# Patient Record
Sex: FEMALE | Race: AMERICAN INDIAN OR ALASKA NATIVE | Employment: OTHER | URBAN - METROPOLITAN AREA
[De-identification: names, ages, dates, MRNs, and addresses within clinical notes are randomized per-mention and may not be internally consistent; named-entity substitution may affect disease eponyms.]

---

## 2018-12-03 ENCOUNTER — OFFICE VISIT (OUTPATIENT)
Dept: INTERNAL MEDICINE CLINIC | Facility: CLINIC | Age: 63
End: 2018-12-03
Payer: MEDICAID

## 2018-12-03 ENCOUNTER — LAB ENCOUNTER (OUTPATIENT)
Dept: LAB | Age: 63
End: 2018-12-03
Attending: INTERNAL MEDICINE
Payer: MEDICAID

## 2018-12-03 VITALS
RESPIRATION RATE: 18 BRPM | HEART RATE: 81 BPM | WEIGHT: 161 LBS | SYSTOLIC BLOOD PRESSURE: 120 MMHG | OXYGEN SATURATION: 98 % | HEIGHT: 61 IN | DIASTOLIC BLOOD PRESSURE: 80 MMHG | TEMPERATURE: 98 F | BODY MASS INDEX: 30.4 KG/M2

## 2018-12-03 DIAGNOSIS — H35.30 MACULAR DEGENERATION OF BOTH EYES, UNSPECIFIED TYPE: ICD-10-CM

## 2018-12-03 DIAGNOSIS — G89.29 CHRONIC PAIN OF BOTH KNEES: ICD-10-CM

## 2018-12-03 DIAGNOSIS — K52.9 COLITIS: Primary | ICD-10-CM

## 2018-12-03 DIAGNOSIS — R73.03 PRE-DIABETES: ICD-10-CM

## 2018-12-03 DIAGNOSIS — K52.9 COLITIS: ICD-10-CM

## 2018-12-03 DIAGNOSIS — M54.50 CHRONIC BILATERAL LOW BACK PAIN WITHOUT SCIATICA: ICD-10-CM

## 2018-12-03 DIAGNOSIS — Z12.39 SCREENING FOR BREAST CANCER: ICD-10-CM

## 2018-12-03 DIAGNOSIS — M25.50 ARTHRALGIA, UNSPECIFIED JOINT: ICD-10-CM

## 2018-12-03 DIAGNOSIS — E78.2 MIXED HYPERLIPIDEMIA: ICD-10-CM

## 2018-12-03 DIAGNOSIS — M54.2 NECK PAIN: ICD-10-CM

## 2018-12-03 DIAGNOSIS — G89.29 CHRONIC BILATERAL LOW BACK PAIN WITHOUT SCIATICA: ICD-10-CM

## 2018-12-03 DIAGNOSIS — M25.561 CHRONIC PAIN OF BOTH KNEES: ICD-10-CM

## 2018-12-03 DIAGNOSIS — M25.562 CHRONIC PAIN OF BOTH KNEES: ICD-10-CM

## 2018-12-03 PROCEDURE — 84439 ASSAY OF FREE THYROXINE: CPT

## 2018-12-03 PROCEDURE — 85025 COMPLETE CBC W/AUTO DIFF WBC: CPT

## 2018-12-03 PROCEDURE — 36415 COLL VENOUS BLD VENIPUNCTURE: CPT

## 2018-12-03 PROCEDURE — 86200 CCP ANTIBODY: CPT

## 2018-12-03 PROCEDURE — 83036 HEMOGLOBIN GLYCOSYLATED A1C: CPT

## 2018-12-03 PROCEDURE — 80061 LIPID PANEL: CPT

## 2018-12-03 PROCEDURE — 84460 ALANINE AMINO (ALT) (SGPT): CPT

## 2018-12-03 PROCEDURE — 84443 ASSAY THYROID STIM HORMONE: CPT

## 2018-12-03 PROCEDURE — 90471 IMMUNIZATION ADMIN: CPT | Performed by: INTERNAL MEDICINE

## 2018-12-03 PROCEDURE — 86803 HEPATITIS C AB TEST: CPT

## 2018-12-03 PROCEDURE — 84450 TRANSFERASE (AST) (SGOT): CPT

## 2018-12-03 PROCEDURE — 86038 ANTINUCLEAR ANTIBODIES: CPT

## 2018-12-03 PROCEDURE — 90686 IIV4 VACC NO PRSV 0.5 ML IM: CPT | Performed by: INTERNAL MEDICINE

## 2018-12-03 PROCEDURE — 86431 RHEUMATOID FACTOR QUANT: CPT

## 2018-12-03 PROCEDURE — 99204 OFFICE O/P NEW MOD 45 MIN: CPT | Performed by: INTERNAL MEDICINE

## 2018-12-03 PROCEDURE — 80048 BASIC METABOLIC PNL TOTAL CA: CPT

## 2018-12-03 RX ORDER — RIBOFLAVIN (VITAMIN B2) 100 MG
100 TABLET ORAL DAILY
COMMUNITY
End: 2021-04-21

## 2018-12-03 RX ORDER — MULTIVIT-MINS NO.63/IRON/FOLIC 27 MG-1 MG
1 TABLET ORAL DAILY
COMMUNITY
End: 2019-02-13

## 2018-12-03 RX ORDER — ACETAMINOPHEN 160 MG
2000 TABLET,DISINTEGRATING ORAL DAILY
COMMUNITY
End: 2019-02-13

## 2018-12-03 RX ORDER — OMEGA-3-ACID ETHYL ESTERS 1 G/1
1 CAPSULE, LIQUID FILLED ORAL DAILY
COMMUNITY
End: 2019-02-13

## 2018-12-03 RX ORDER — ACETAMINOPHEN 500 MG
1000 TABLET ORAL
COMMUNITY
End: 2019-02-13

## 2018-12-03 NOTE — PROGRESS NOTES
Dori Love is a 58year old female. HPI:   Patient presents for the following issues. We will re-schedule CPX. She moved here in Sept, 2018 from [place], [place] so she is establishing care. She moved here to be closer to her daughter. She is a . f/c  NEURO: denies any  LH, dizzyness, LOC, falls. Right sided headaches - more often in the colder weather. Usually has a headache 2-3 times per week. They last for 5-10 minutes. Not sure what improves it. She tries lying down and resting.  Tylenol helps s b/l  She has pain over 16/18 tender points    ASSESSMENT AND PLAN:   # Macular Degeneration in both eyes: no vision out of right eye. Needs referral to ophtho.    # Arthralgias of both hands, knees, back, and lower legs: DDX includes inflammatory arthritis

## 2018-12-04 ENCOUNTER — TELEPHONE (OUTPATIENT)
Dept: INTERNAL MEDICINE CLINIC | Facility: CLINIC | Age: 63
End: 2018-12-04

## 2018-12-04 NOTE — TELEPHONE ENCOUNTER
Records Requested from:    [facility] - previous pcp    Phone: [phone]  Fax: [phone]      Confirmation was received. Copy of form made and placed in teal accordion file. Original form sent to scanning.

## 2018-12-12 ENCOUNTER — TELEPHONE (OUTPATIENT)
Dept: INTERNAL MEDICINE CLINIC | Facility: CLINIC | Age: 63
End: 2018-12-12

## 2019-01-07 ENCOUNTER — HOSPITAL ENCOUNTER (OUTPATIENT)
Dept: PHYSICAL THERAPY | Facility: HOSPITAL | Age: 64
Setting detail: THERAPIES SERIES
Discharge: HOME | End: 2019-01-07
Attending: INTERNAL MEDICINE
Payer: MEDICAID

## 2019-01-07 DIAGNOSIS — M54.2 NECK PAIN: ICD-10-CM

## 2019-01-07 DIAGNOSIS — M54.50 CHRONIC BILATERAL LOW BACK PAIN WITHOUT SCIATICA: ICD-10-CM

## 2019-01-07 DIAGNOSIS — G89.29 CHRONIC BILATERAL LOW BACK PAIN WITHOUT SCIATICA: ICD-10-CM

## 2019-01-07 DIAGNOSIS — M25.561 CHRONIC PAIN OF BOTH KNEES: ICD-10-CM

## 2019-01-07 DIAGNOSIS — G89.29 CHRONIC PAIN OF BOTH KNEES: ICD-10-CM

## 2019-01-07 DIAGNOSIS — M25.562 CHRONIC PAIN OF BOTH KNEES: ICD-10-CM

## 2019-01-07 PROCEDURE — 97162 PT EVAL MOD COMPLEX 30 MIN: CPT

## 2019-01-07 PROCEDURE — 97110 THERAPEUTIC EXERCISES: CPT

## 2019-01-07 NOTE — PROGRESS NOTES
SPINE EVALUATION:   Referring Physician: Dr. Connie Evans  Diagnosis: chronic bilateral LBP w/o sciatica, chronic pain of both knees, neck pain     Date of Service: 1/7/2019     PATIENT SUMMARY   Leah Mistry is a 58year old female who presents to therapy tod above impairments to decrease pain and return to prior level of function.     Precautions:  None  OBJECTIVE:   Observation/Posture: increased thoracic kyphosis, FHP, rounded shoulder, increased lumbar lordosis  Gait: slow gait speed, antalgic gait  Neuro Sc Goals: 1. Patient will demonstrate lumbar flexion within available range with no pain within 6 weeks in order to pick objects from the floor.   2. Patient will tolerate ambulation for 30 min with no increase in pain within 6 weeks to improve community

## 2019-01-09 ENCOUNTER — HOSPITAL ENCOUNTER (OUTPATIENT)
Dept: PHYSICAL THERAPY | Facility: HOSPITAL | Age: 64
Setting detail: THERAPIES SERIES
Discharge: HOME | End: 2019-01-09
Attending: INTERNAL MEDICINE
Payer: MEDICAID

## 2019-01-09 PROCEDURE — 97110 THERAPEUTIC EXERCISES: CPT

## 2019-01-09 NOTE — PROGRESS NOTES
Dx: chronic bilateral LBP w/o sciatica, chronic pain of both knees, neck pain             Authorized # of Visits:  8         Next MD visit: none scheduled  Fall Risk: standard         Precautions: n/a             Subjective: Patient reports that the exerci

## 2019-01-14 ENCOUNTER — APPOINTMENT (OUTPATIENT)
Dept: PHYSICAL THERAPY | Facility: HOSPITAL | Age: 64
End: 2019-01-14
Payer: MEDICAID

## 2019-01-15 ENCOUNTER — HOSPITAL ENCOUNTER (OUTPATIENT)
Dept: PHYSICAL THERAPY | Facility: HOSPITAL | Age: 64
Setting detail: THERAPIES SERIES
Discharge: HOME | End: 2019-01-15
Attending: INTERNAL MEDICINE
Payer: MEDICAID

## 2019-01-15 PROCEDURE — 97110 THERAPEUTIC EXERCISES: CPT

## 2019-01-15 NOTE — PROGRESS NOTES
Dx: chronic bilateral LBP w/o sciatica, chronic pain of both knees, neck pain             Authorized # of Visits:  8         Next MD visit: none scheduled  Fall Risk: standard         Precautions: n/a             Subjective: Patient reports that standing l lumbar rotation mobilzation           Charges: Betty 3( 40 min) manual x 0 ( 5 min)   Total Timed Treatment: 45 min     Total Treatment Time: 45 min

## 2019-01-16 ENCOUNTER — APPOINTMENT (OUTPATIENT)
Dept: PHYSICAL THERAPY | Facility: HOSPITAL | Age: 64
End: 2019-01-16
Payer: MEDICAID

## 2019-01-18 ENCOUNTER — APPOINTMENT (OUTPATIENT)
Dept: PHYSICAL THERAPY | Facility: HOSPITAL | Age: 64
End: 2019-01-18
Payer: MEDICAID

## 2019-01-21 ENCOUNTER — APPOINTMENT (OUTPATIENT)
Dept: PHYSICAL THERAPY | Facility: HOSPITAL | Age: 64
End: 2019-01-21
Payer: MEDICAID

## 2019-01-22 ENCOUNTER — HOSPITAL ENCOUNTER (OUTPATIENT)
Dept: PHYSICAL THERAPY | Facility: HOSPITAL | Age: 64
Setting detail: THERAPIES SERIES
Discharge: HOME | End: 2019-01-22
Attending: INTERNAL MEDICINE
Payer: MEDICAID

## 2019-01-22 PROCEDURE — 97110 THERAPEUTIC EXERCISES: CPT

## 2019-01-22 NOTE — PROGRESS NOTES
Dx: chronic bilateral LBP w/o sciatica, chronic pain of both knees, neck pain             Authorized # of Visits:  8         Next MD visit: none scheduled  Fall Risk: standard         Precautions: n/a             Subjective: Patient reports that she has be min       Heel raises, 2x10 Heel raises, 2x10 Heel raises, 2x15       Clam, 2x15 Bridge, 2x10 Posterior and medial knee STM, 2 min       HS stretch HS stretch Knee flexion w/ fulcrum distraction       Calf stretch Calf stretch S/l T8-11 CPA       S/l lumba

## 2019-01-23 ENCOUNTER — APPOINTMENT (OUTPATIENT)
Dept: PHYSICAL THERAPY | Facility: HOSPITAL | Age: 64
End: 2019-01-23
Payer: MEDICAID

## 2019-01-25 ENCOUNTER — APPOINTMENT (OUTPATIENT)
Dept: PHYSICAL THERAPY | Facility: HOSPITAL | Age: 64
End: 2019-01-25
Payer: MEDICAID

## 2019-01-28 ENCOUNTER — APPOINTMENT (OUTPATIENT)
Dept: PHYSICAL THERAPY | Facility: HOSPITAL | Age: 64
End: 2019-01-28
Payer: MEDICAID

## 2019-01-29 ENCOUNTER — APPOINTMENT (OUTPATIENT)
Dept: PHYSICAL THERAPY | Facility: HOSPITAL | Age: 64
End: 2019-01-29
Payer: MEDICAID

## 2019-02-01 ENCOUNTER — APPOINTMENT (OUTPATIENT)
Dept: PHYSICAL THERAPY | Facility: HOSPITAL | Age: 64
End: 2019-02-01
Attending: INTERNAL MEDICINE
Payer: MEDICAID

## 2019-02-05 ENCOUNTER — HOSPITAL ENCOUNTER (OUTPATIENT)
Dept: PHYSICAL THERAPY | Facility: HOSPITAL | Age: 64
Setting detail: THERAPIES SERIES
Discharge: HOME | End: 2019-02-05
Attending: INTERNAL MEDICINE
Payer: MEDICAID

## 2019-02-05 PROCEDURE — 97110 THERAPEUTIC EXERCISES: CPT

## 2019-02-05 NOTE — PROGRESS NOTES
Dx: chronic bilateral LBP w/o sciatica, chronic pain of both knees, neck pain             Authorized # of Visits:  8         Next MD visit: none scheduled  Fall Risk: standard         Precautions: n/a             Subjective: Patient reports that her back i order to pick objects from the floor. (progressing)  2. Patient will tolerate ambulation for 30 min with no increase in pain within 6 weeks to improve community involvement. (progressing)  3.  Patient will demonstrate 4/5 hip strength in all directions in o

## 2019-02-12 ENCOUNTER — HOSPITAL ENCOUNTER (OUTPATIENT)
Dept: PHYSICAL THERAPY | Facility: HOSPITAL | Age: 64
Setting detail: THERAPIES SERIES
Discharge: HOME | End: 2019-02-12
Attending: INTERNAL MEDICINE
Payer: MEDICAID

## 2019-02-12 PROCEDURE — 97110 THERAPEUTIC EXERCISES: CPT

## 2019-02-12 PROCEDURE — 97140 MANUAL THERAPY 1/> REGIONS: CPT

## 2019-02-12 NOTE — PROGRESS NOTES
Dx: chronic bilateral LBP w/o sciatica, chronic pain of both knees, neck pain             Authorized # of Visits:  8         Next MD visit: none scheduled  Fall Risk: standard         Precautions: n/a             Subjective: Patient complains of continued lv 5, 5 min Nustep, lv 5, 5 min Nustep, lv 6, 5 min Nustep, lv 6, 5 min UBE, lv5, 5 min     Supine trunk rotation Supine trunk rotation Dual cable row, 10#, 2x10  Supine<>sit trainng     Supine marching, 2x10 Tib/fem AP, 5 min Yellow TB horiz abd, 2x10 mechelle

## 2019-02-13 ENCOUNTER — HOSPITAL ENCOUNTER (OUTPATIENT)
Dept: MAMMOGRAPHY | Age: 64
Discharge: HOME | End: 2019-02-13
Attending: INTERNAL MEDICINE
Payer: MEDICAID

## 2019-02-13 ENCOUNTER — OFFICE VISIT (OUTPATIENT)
Dept: INTERNAL MEDICINE CLINIC | Facility: CLINIC | Age: 64
End: 2019-02-13
Payer: MEDICAID

## 2019-02-13 VITALS
WEIGHT: 164.5 LBS | SYSTOLIC BLOOD PRESSURE: 128 MMHG | HEIGHT: 61 IN | DIASTOLIC BLOOD PRESSURE: 66 MMHG | OXYGEN SATURATION: 98 % | HEART RATE: 89 BPM | TEMPERATURE: 98 F | RESPIRATION RATE: 16 BRPM | BODY MASS INDEX: 31.06 KG/M2

## 2019-02-13 DIAGNOSIS — Z00.00 ROUTINE GENERAL MEDICAL EXAMINATION AT A HEALTH CARE FACILITY: Primary | ICD-10-CM

## 2019-02-13 DIAGNOSIS — E11.9 TYPE 2 DIABETES MELLITUS WITHOUT COMPLICATION, WITHOUT LONG-TERM CURRENT USE OF INSULIN (HCC): ICD-10-CM

## 2019-02-13 DIAGNOSIS — K52.9 COLITIS: ICD-10-CM

## 2019-02-13 DIAGNOSIS — Z12.4 SCREENING FOR CERVICAL CANCER: ICD-10-CM

## 2019-02-13 DIAGNOSIS — Z12.39 SCREENING FOR BREAST CANCER: ICD-10-CM

## 2019-02-13 DIAGNOSIS — M89.9 DISORDER OF BONE AND CARTILAGE: ICD-10-CM

## 2019-02-13 DIAGNOSIS — M94.9 DISORDER OF BONE AND CARTILAGE: ICD-10-CM

## 2019-02-13 DIAGNOSIS — E78.2 MIXED HYPERLIPIDEMIA: ICD-10-CM

## 2019-02-13 PROCEDURE — 88175 CYTOPATH C/V AUTO FLUID REDO: CPT | Performed by: INTERNAL MEDICINE

## 2019-02-13 PROCEDURE — 99396 PREV VISIT EST AGE 40-64: CPT | Performed by: INTERNAL MEDICINE

## 2019-02-13 PROCEDURE — 77063 BREAST TOMOSYNTHESIS BI: CPT | Performed by: INTERNAL MEDICINE

## 2019-02-13 PROCEDURE — 77067 SCR MAMMO BI INCL CAD: CPT | Performed by: INTERNAL MEDICINE

## 2019-02-13 RX ORDER — ACETAMINOPHEN 160 MG
2000 TABLET,DISINTEGRATING ORAL DAILY
Qty: 90 CAPSULE | Refills: 3 | Status: SHIPPED | OUTPATIENT
Start: 2019-02-13 | End: 2020-10-22 | Stop reason: ALTCHOICE

## 2019-02-13 RX ORDER — ACETAMINOPHEN 500 MG
1000 TABLET ORAL EVERY 8 HOURS PRN
Qty: 90 TABLET | Refills: 0 | Status: SHIPPED | OUTPATIENT
Start: 2019-02-13 | End: 2019-03-18

## 2019-02-13 RX ORDER — MULTIVIT-MINS NO.63/IRON/FOLIC 27 MG-1 MG
1 TABLET ORAL DAILY
Qty: 90 TABLET | Refills: 3 | Status: SHIPPED | OUTPATIENT
Start: 2019-02-13 | End: 2019-06-19

## 2019-02-13 RX ORDER — OMEGA-3-ACID ETHYL ESTERS 1 G/1
1 CAPSULE, LIQUID FILLED ORAL DAILY
Qty: 90 CAPSULE | Refills: 3 | Status: SHIPPED | OUTPATIENT
Start: 2019-02-13

## 2019-02-13 NOTE — PROGRESS NOTES
Dilip Arreola is a 58year old female. HPI:   Patient presents for a new diagnosis. 1. Arthralgias: attending PT but that has not helped. Inflammatory markers were not revealing for inflammaiton. Worst pain is in both 1st MCP joints at base of thumb. MI   • Diabetes Father       No past medical history on file.    Past Surgical History:   Procedure Laterality Date   • COLONOSCOPY  07/17/2018   • OOPHORECTOMY Right 1994    cyst   • OTHER SURGICAL HISTORY  1998    right ovarian removal for cyst      Soc based nutrition, regular exercise, and practicing mindfulness. We outlined small, achievable goals.    - DM eye exam on 12/12/18 by Augusto Ramos negative for diabetic retinopathy b/l.   - Foot Exam: 2/2019, educated on nightly foot exams  - LDL: we will start

## 2019-02-13 NOTE — PATIENT INSTRUCTIONS
Please do not exceed 3000 mg (3 grams) of tylenol in 24 hours. It is very important you look at the amount of tylenol (acetaminophen) in any of your over the counter medications to ensure you do not exceed a safe amount of tylenol per day.     You need 3

## 2019-02-19 ENCOUNTER — HOSPITAL ENCOUNTER (OUTPATIENT)
Dept: PHYSICAL THERAPY | Facility: HOSPITAL | Age: 64
Setting detail: THERAPIES SERIES
Discharge: HOME | End: 2019-02-19
Attending: INTERNAL MEDICINE
Payer: MEDICAID

## 2019-02-19 PROCEDURE — 97140 MANUAL THERAPY 1/> REGIONS: CPT

## 2019-02-19 PROCEDURE — 97110 THERAPEUTIC EXERCISES: CPT

## 2019-02-19 NOTE — PROGRESS NOTES
Dx: chronic bilateral LBP w/o sciatica, chronic pain of both knees, neck pain             Authorized # of Visits:  8         Next MD visit: none scheduled  Fall Risk: standard         Precautions: n/a             Subjective: Patient reports continued back trainng S/l C7 CPA, gr III    Supine marching, 2x10 Tib/fem AP, 5 min Yellow TB horiz abd, 2x10 reassessment, 10 min Seated thoracic roatation, 20 Supine cervical manual traction    Bridge, 2x10 Passive knee flexion w/ strap, [id number] Bar press to chin tuck

## 2019-02-20 ENCOUNTER — HOSPITAL ENCOUNTER (OUTPATIENT)
Dept: PHYSICAL THERAPY | Facility: HOSPITAL | Age: 64
Setting detail: THERAPIES SERIES
Discharge: HOME | End: 2019-02-20
Attending: INTERNAL MEDICINE
Payer: MEDICAID

## 2019-02-20 PROCEDURE — 97140 MANUAL THERAPY 1/> REGIONS: CPT

## 2019-02-20 PROCEDURE — 97110 THERAPEUTIC EXERCISES: CPT

## 2019-02-20 NOTE — PROGRESS NOTES
SPINE PROGRESS:   Referring Physician: Dr. Roseann Hernandez  Diagnosis: chronic bilateral LBP w/o sciatica, chronic pain of both knees, neck pain     Date of Service: 2/20/2019     PATIENT SUMMARY   Federico Clinton is a 58year old female who has attended 8 sessions ROM:  Flexion: R WNL; L 110 deg  Extension: R WNL; L WNL  ER: R WNL; L WNL  IR: R WNL; L WNL    Accessory motion: N/T  Palpation: TTP bilateral lumbar paraspinals    Strength:   UE/Scapular LE     Mid trap: R 4-/5; L 4-/5  Low trap: R 4-/5; L 4-/5   St instruction    Education or treatment limitation: None  Rehab Potential:good    FOTO: 40/100    Patient/Family/Caregiver was advised of these findings, precautions, and treatment options and has agreed to actively participate in planning and for this cours

## 2019-02-27 ENCOUNTER — HOSPITAL ENCOUNTER (OUTPATIENT)
Dept: PHYSICAL THERAPY | Facility: HOSPITAL | Age: 64
Setting detail: THERAPIES SERIES
Discharge: HOME | End: 2019-02-27
Attending: INTERNAL MEDICINE
Payer: MEDICAID

## 2019-02-27 PROCEDURE — 97110 THERAPEUTIC EXERCISES: CPT

## 2019-02-27 PROCEDURE — 97140 MANUAL THERAPY 1/> REGIONS: CPT

## 2019-02-27 NOTE — PROGRESS NOTES
Dx: chronic bilateral LBP w/o sciatica, chronic pain of both knees, neck pain             Authorized # of Visits:  15         Next MD visit: none scheduled  Fall Risk: standard         Precautions: n/a             Subjective: Patient reports that her agusto Supine trunk rotation Dual cable row, 10#, 2x10  Supine<>sit trainng S/l C7 CPA, gr III Table pushup, 2x10   Supine marching, 2x10 Tib/fem AP, 5 min Yellow TB horiz abd, 2x10 reassessment, 10 min Seated thoracic roatation, 20 Supine cervical manual tractio

## 2019-03-06 ENCOUNTER — HOSPITAL ENCOUNTER (OUTPATIENT)
Dept: PHYSICAL THERAPY | Facility: HOSPITAL | Age: 64
Setting detail: THERAPIES SERIES
Discharge: HOME | End: 2019-03-06
Attending: INTERNAL MEDICINE
Payer: MEDICAID

## 2019-03-06 PROCEDURE — 97140 MANUAL THERAPY 1/> REGIONS: CPT

## 2019-03-06 PROCEDURE — 97110 THERAPEUTIC EXERCISES: CPT

## 2019-03-06 NOTE — PROGRESS NOTES
Dx: chronic bilateral LBP w/o sciatica, chronic pain of both knees, neck pain             Authorized # of Visits:  15         Next MD visit: none scheduled  Fall Risk: standard         Precautions: n/a             Subjective: Patient reports that her L a 6/8 Date: 2/19/19  Tx#: 7/8 Date: 2/17/19  Tx#: 9/14 Date: 3/6/19  Tx#: 10/14       Nustep, lv 5, 5 min Nustep, lv 5, 5 min Nustep, lv 6, 5 min Nustep, lv 6, 5 min UBE, lv5, 5 min  UBE, lv5, 5 min scapular retraction w/ ER, yellow, 2x15       Supine trunk Calf stretch Calf stretch S/l T8-11 CPA            S/l lumbar rotation mobilzation  S/l trunk rotation, 10 each              Charges: Betty 2( 30 min) manual x 1 ( 15 min)   Total Timed Treatment: 45 min     Total Treatment Time: 45 min

## 2019-03-12 ENCOUNTER — HOSPITAL ENCOUNTER (OUTPATIENT)
Dept: PHYSICAL THERAPY | Facility: HOSPITAL | Age: 64
Setting detail: THERAPIES SERIES
Discharge: HOME | End: 2019-03-12
Attending: INTERNAL MEDICINE
Payer: MEDICAID

## 2019-03-12 PROCEDURE — 97110 THERAPEUTIC EXERCISES: CPT

## 2019-03-12 PROCEDURE — 97140 MANUAL THERAPY 1/> REGIONS: CPT

## 2019-03-12 NOTE — PROGRESS NOTES
Dx: chronic bilateral LBP w/o sciatica, chronic pain of both knees, neck pain             Authorized # of Visits:  15         Next MD visit: none scheduled  Fall Risk: standard         Precautions: n/a             Subjective: Patient reports that she had Date: 1/22/19  Tx#: 4/8 Date: 2/5/19  Tx#: 5/8 Date: 2/12/19  Tx#: 6/8 Date: 2/19/19  Tx#: 7/8 Date: 2/17/19  Tx#: 9/14 Date: 3/6/19  Tx#: 10/14 Date: 3/12/19  Tx#: 11/14      Nustep, lv 5, 5 min Nustep, lv 5, 5 min Nustep, lv 6, 5 min Nustep, lv 6, 5 min w/ hammer, 20 Elbow STM, 5 min Active thumb adduction, extension, 20 each      HS stretch HS stretch Knee flexion w/ fulcrum distraction Seated chin tuck w/ cuing for thoracic extension, 15x5 sec Seated chin tuck w/ cuing for thoracic extension, 15x5 sec

## 2019-03-13 ENCOUNTER — HOSPITAL ENCOUNTER (OUTPATIENT)
Dept: PHYSICAL THERAPY | Facility: HOSPITAL | Age: 64
Setting detail: THERAPIES SERIES
Discharge: HOME | End: 2019-03-13
Attending: INTERNAL MEDICINE
Payer: MEDICAID

## 2019-03-13 PROCEDURE — 97140 MANUAL THERAPY 1/> REGIONS: CPT

## 2019-03-13 PROCEDURE — 97110 THERAPEUTIC EXERCISES: CPT

## 2019-03-13 NOTE — PROGRESS NOTES
Dx: chronic bilateral LBP w/o sciatica, chronic pain of both knees, neck pain             Authorized # of Visits:  15         Next MD visit: none scheduled  Fall Risk: standard         Precautions: n/a             Subjective: Patient reports that she fel Date: 2/17/19  Tx#: 9/14 Date: 3/6/19  Tx#: 10/14 Date: 3/12/19  Tx#: 11/14 Date: 3/13/19  Tx#: 12/14     Nustep, lv 5, 5 min Nustep, lv 5, 5 min Nustep, lv 6, 5 min Nustep, lv 6, 5 min UBE, lv5, 5 min  UBE, lv5, 5 min scapular retraction w/ ER, yellow, 2x Supine median nerve glides resisted pronation, supination w/ hammer, 20 Elbow STM, 5 min Active thumb adduction, extension, 20 each Lateral walk w/ iso trunk rotation, red TB, 2x5     HS stretch HS stretch Knee flexion w/ fulcrum distraction Seated chin tu

## 2019-03-18 DIAGNOSIS — E11.9 TYPE 2 DIABETES MELLITUS WITHOUT COMPLICATION, WITHOUT LONG-TERM CURRENT USE OF INSULIN (HCC): ICD-10-CM

## 2019-03-18 DIAGNOSIS — E78.2 MIXED HYPERLIPIDEMIA: ICD-10-CM

## 2019-03-19 ENCOUNTER — HOSPITAL ENCOUNTER (OUTPATIENT)
Dept: PHYSICAL THERAPY | Facility: HOSPITAL | Age: 64
Setting detail: THERAPIES SERIES
Discharge: HOME | End: 2019-03-19
Attending: INTERNAL MEDICINE
Payer: MEDICAID

## 2019-03-19 PROCEDURE — 97140 MANUAL THERAPY 1/> REGIONS: CPT

## 2019-03-19 PROCEDURE — 97110 THERAPEUTIC EXERCISES: CPT

## 2019-03-19 RX ORDER — ACETAMINOPHEN 500 MG
500 TABLET ORAL EVERY 4 HOURS PRN
Qty: 90 TABLET | Refills: 0 | Status: SHIPPED | OUTPATIENT
Start: 2019-03-19 | End: 2019-04-23

## 2019-03-19 NOTE — PROGRESS NOTES
Dx: chronic bilateral LBP w/o sciatica, chronic pain of both knees, neck pain             Authorized # of Visits:  15         Next MD visit: none scheduled  Fall Risk: standard         Precautions: n/a             Subjective: Patient reports that her thu min Nustep, lv 6, 5 min Nustep, lv 6, 5 min UBE, lv5, 5 min  UBE, lv5, 5 min scapular retraction w/ ER, yellow, 2x15 UBE, lv5, 5 min UBE, lv5, 5 min Nustep, lv 6, 5 min    Supine trunk rotation Supine trunk rotation Dual cable row, 10#, 2x10  Supine<>sit t 2x10 Posterior and medial knee STM, 2 min Sit<>stand, 2x10  Supine median nerve glides resisted pronation, supination w/ hammer, 20 Elbow STM, 5 min Active thumb adduction, extension, 20 each Lateral walk w/ iso trunk rotation, red TB, 2x5 Finger web flexi

## 2019-03-20 ENCOUNTER — TELEPHONE (OUTPATIENT)
Dept: INTERNAL MEDICINE CLINIC | Facility: CLINIC | Age: 64
End: 2019-03-20

## 2019-03-20 ENCOUNTER — HOSPITAL ENCOUNTER (OUTPATIENT)
Dept: PHYSICAL THERAPY | Facility: HOSPITAL | Age: 64
Setting detail: THERAPIES SERIES
Discharge: HOME | End: 2019-03-20
Attending: INTERNAL MEDICINE
Payer: MEDICAID

## 2019-03-20 PROCEDURE — 97110 THERAPEUTIC EXERCISES: CPT

## 2019-03-20 PROCEDURE — 97140 MANUAL THERAPY 1/> REGIONS: CPT

## 2019-03-20 NOTE — PROGRESS NOTES
Dx: chronic bilateral LBP w/o sciatica, chronic pain of both knees, neck pain             Authorized # of Visits:  15         Next MD visit: none scheduled  Fall Risk: standard         Precautions: n/a             Subjective: Patient reports that her kne Date: 1/22/19  Tx#: 4/8 Date: 2/5/19  Tx#: 5/8 Date: 2/12/19  Tx#: 6/8 Date: 2/19/19  Tx#: 7/8 Date: 2/17/19  Tx#: 9/14 Date: 3/6/19  Tx#: 10/14 Date: 3/12/19  Tx#: 11/14 Date: 3/13/19  Tx#: 12/14 Date: 3/19/19  Tx#: 13/14 Date: 3/20/19  Tx#: 14/14   Yulissa volar glide, 10 min Thenar STM  CMC volar glide, 10 min Floor to stand transfer trainng Supine active HS stretch, 20 Supine active HS stretch, 20   Heel raises, 2x10 Heel raises, 2x10 Heel raises, 2x15 Seated lumbar flexion, 15  Seated thoracic extension w

## 2019-03-20 NOTE — TELEPHONE ENCOUNTER
Called patient and informed her that insurance declined alina and per Dr. Abraham Resides will have to pay out of pocket for medication.

## 2019-03-25 ENCOUNTER — TELEPHONE (OUTPATIENT)
Dept: INTERNAL MEDICINE CLINIC | Facility: CLINIC | Age: 64
End: 2019-03-25

## 2019-03-25 NOTE — TELEPHONE ENCOUNTER
KEY: [id number]      Your information has been submitted to [employer]. [employer] is reviewing the PA request and you will receive an electronic response.  You may check for the updated outcome later by reopening this request. The standard fax determination

## 2019-04-23 DIAGNOSIS — E11.9 TYPE 2 DIABETES MELLITUS WITHOUT COMPLICATION, WITHOUT LONG-TERM CURRENT USE OF INSULIN (HCC): ICD-10-CM

## 2019-04-23 DIAGNOSIS — E78.2 MIXED HYPERLIPIDEMIA: ICD-10-CM

## 2019-04-23 RX ORDER — ACETAMINOPHEN 500 MG
TABLET ORAL
Qty: 90 TABLET | Refills: 0 | Status: SHIPPED | OUTPATIENT
Start: 2019-04-23 | End: 2019-05-23

## 2019-05-23 DIAGNOSIS — E78.2 MIXED HYPERLIPIDEMIA: ICD-10-CM

## 2019-05-23 DIAGNOSIS — E11.9 TYPE 2 DIABETES MELLITUS WITHOUT COMPLICATION, WITHOUT LONG-TERM CURRENT USE OF INSULIN (HCC): ICD-10-CM

## 2019-05-23 RX ORDER — ACETAMINOPHEN 500 MG
TABLET ORAL
Qty: 90 TABLET | Refills: 0 | Status: SHIPPED | OUTPATIENT
Start: 2019-05-23 | End: 2019-06-22

## 2019-05-23 NOTE — TELEPHONE ENCOUNTER
Medication(s) to Refill:   Requested Prescriptions     Pending Prescriptions Disp Refills   • ACETAMINOPHEN 500 MG Oral Tab [Pharmacy Med Name: ACETAMINOPHEN 500 MG TABLET] 90 tablet 0     Sig: TAKE ONE TABLET BY MOUTH EVERY 4 HOURS AS NEEDED FOR PAIN.  DO

## 2019-06-19 ENCOUNTER — HOSPITAL ENCOUNTER (OUTPATIENT)
Dept: GENERAL RADIOLOGY | Facility: HOSPITAL | Age: 64
Discharge: HOME | End: 2019-06-19
Attending: INTERNAL MEDICINE
Payer: MEDICAID

## 2019-06-19 ENCOUNTER — OFFICE VISIT (OUTPATIENT)
Dept: RHEUMATOLOGY | Facility: CLINIC | Age: 64
End: 2019-06-19
Payer: MEDICAID

## 2019-06-19 ENCOUNTER — APPOINTMENT (OUTPATIENT)
Dept: LAB | Facility: HOSPITAL | Age: 64
End: 2019-06-19
Attending: INTERNAL MEDICINE
Payer: MEDICAID

## 2019-06-19 VITALS
SYSTOLIC BLOOD PRESSURE: 128 MMHG | RESPIRATION RATE: 16 BRPM | WEIGHT: 165 LBS | DIASTOLIC BLOOD PRESSURE: 84 MMHG | HEART RATE: 76 BPM | BODY MASS INDEX: 31 KG/M2

## 2019-06-19 DIAGNOSIS — M79.642 BILATERAL HAND PAIN: ICD-10-CM

## 2019-06-19 DIAGNOSIS — H53.9 VISION CHANGES: ICD-10-CM

## 2019-06-19 DIAGNOSIS — M19.049 CMC ARTHRITIS: ICD-10-CM

## 2019-06-19 DIAGNOSIS — M54.50 CHRONIC MIDLINE LOW BACK PAIN WITHOUT SCIATICA: ICD-10-CM

## 2019-06-19 DIAGNOSIS — M25.50 POLYARTHRALGIA: ICD-10-CM

## 2019-06-19 DIAGNOSIS — G89.29 CHRONIC MIDLINE LOW BACK PAIN WITHOUT SCIATICA: ICD-10-CM

## 2019-06-19 DIAGNOSIS — M25.571 CHRONIC PAIN OF BOTH ANKLES: ICD-10-CM

## 2019-06-19 DIAGNOSIS — M79.641 BILATERAL HAND PAIN: ICD-10-CM

## 2019-06-19 DIAGNOSIS — M25.572 CHRONIC PAIN OF BOTH ANKLES: ICD-10-CM

## 2019-06-19 DIAGNOSIS — R51.9 TEMPORAL HEADACHE: ICD-10-CM

## 2019-06-19 DIAGNOSIS — G89.29 CHRONIC PAIN OF BOTH ANKLES: ICD-10-CM

## 2019-06-19 DIAGNOSIS — R51.9 TEMPORAL HEADACHE: Primary | ICD-10-CM

## 2019-06-19 DIAGNOSIS — M79.10 MYALGIA: ICD-10-CM

## 2019-06-19 PROCEDURE — 86812 HLA TYPING A B OR C: CPT

## 2019-06-19 PROCEDURE — 85652 RBC SED RATE AUTOMATED: CPT

## 2019-06-19 PROCEDURE — 73130 X-RAY EXAM OF HAND: CPT | Performed by: INTERNAL MEDICINE

## 2019-06-19 PROCEDURE — 86140 C-REACTIVE PROTEIN: CPT

## 2019-06-19 PROCEDURE — 72110 X-RAY EXAM L-2 SPINE 4/>VWS: CPT | Performed by: INTERNAL MEDICINE

## 2019-06-19 PROCEDURE — 99205 OFFICE O/P NEW HI 60 MIN: CPT | Performed by: INTERNAL MEDICINE

## 2019-06-19 PROCEDURE — 36415 COLL VENOUS BLD VENIPUNCTURE: CPT

## 2019-06-19 PROCEDURE — 84550 ASSAY OF BLOOD/URIC ACID: CPT

## 2019-06-19 NOTE — PROGRESS NOTES
?  RHEUMATOLOGY NEW PATIENT   Date of visit: 6/19/2019  ? Patient presents with:  Establish Care: NP ref by PCP Arthralgia.  Pt states 'has joint pain in multiple areas, worst in thumbs, has a hard time gripping things.' No family hx of autoimmune diseases ophthalmology/neuro-ophthalmology regarding her vision changes. As a side I am concerned the patient may have fibromyalgia and untreated significant anxiety.   Patient did lose her son in a car accident less than 10 years ago and seems to be suffering from Avinash Haywood is a 58year old female with the following active problems who is referred for rheumatologic evaluation due to complaints particularly polyarthralgias as well as recent concern for possible temporal arteritis.     States her pain first started in her back of the skull. Denies jaw pain or symptoms consistent with jaw claudication. States headaches can be severe that can wake her from her sleep. Also has a hard time falling asleep, due to mind racing as well as the pain itself.     Ophthalmology has now s Heart Disorder Father         MI   • Diabetes Father      Social History:  Social History    Tobacco Use      Smoking status: Never Smoker      Smokeless tobacco: Never Used    Alcohol use: No      Frequency: Never    Drug use: No    Medications:    Outpat not bruise/bleed easily. Psychiatric/Behavioral: Negative for depression. The patient is not nervous/anxious and does not have insomnia.       PHYSICAL EXAM   Today's Vitals:  Temperature Blood Pressure Heart Rate Resp Rate SpO2     BP: 128/84 Pulse: 76 R +/+  Second rib -/-  Lateral epicondyle +/+  Knee +/+  12 tender points positive     Lymphadenopathy:     She has no cervical adenopathy. Neurological: She is alert and oriented to person, place, and time. No cranial nerve deficit.    Skin: Skin is warm 4.01 12/03/2018    NEPERCENT 56.7 12/03/2018    LYPERCENT 35.1 12/03/2018    MOPERCENT 5.8 12/03/2018    EOPERCENT 1.4 12/03/2018    BAPERCENT 0.6 12/03/2018    NE 4.01 12/03/2018    LYMABS 2.48 12/03/2018    MOABSO 0.41 12/03/2018    EOABSO 0.10 12/03/201

## 2019-06-22 DIAGNOSIS — E11.9 TYPE 2 DIABETES MELLITUS WITHOUT COMPLICATION, WITHOUT LONG-TERM CURRENT USE OF INSULIN (HCC): ICD-10-CM

## 2019-06-22 DIAGNOSIS — E78.2 MIXED HYPERLIPIDEMIA: ICD-10-CM

## 2019-06-25 RX ORDER — ACETAMINOPHEN 500 MG
TABLET ORAL
Qty: 90 TABLET | Refills: 0 | Status: SHIPPED | OUTPATIENT
Start: 2019-06-25

## 2019-06-25 NOTE — TELEPHONE ENCOUNTER
Refill requested:   Requested Prescriptions     Pending Prescriptions Disp Refills   • ACETAMINOPHEN 500 MG Oral Tab [Pharmacy Med Name: ACETAMINOPHEN 500 MG TABLET] 90 tablet 0     Sig: TAKE ONE TABLET BY MOUTH EVERY 4 HOURS AS NEEDED FOR PAIN.  DO NOT EXC

## 2019-06-28 ENCOUNTER — OFFICE VISIT (OUTPATIENT)
Dept: NEUROLOGY | Facility: CLINIC | Age: 64
End: 2019-06-28
Payer: MEDICAID

## 2019-06-28 VITALS
HEART RATE: 76 BPM | DIASTOLIC BLOOD PRESSURE: 74 MMHG | WEIGHT: 168 LBS | RESPIRATION RATE: 14 BRPM | SYSTOLIC BLOOD PRESSURE: 142 MMHG | BODY MASS INDEX: 32 KG/M2

## 2019-06-28 DIAGNOSIS — G44.201 ACUTE INTRACTABLE TENSION-TYPE HEADACHE: ICD-10-CM

## 2019-06-28 DIAGNOSIS — H35.30 MACULAR DEGENERATION OF BOTH EYES, UNSPECIFIED TYPE: Primary | ICD-10-CM

## 2019-06-28 PROCEDURE — 99204 OFFICE O/P NEW MOD 45 MIN: CPT | Performed by: OTHER

## 2019-06-28 RX ORDER — MULTIVITAMIN WITH FOLIC ACID 400 MCG
TABLET ORAL DAILY
Refills: 2 | COMMUNITY
Start: 2019-06-22

## 2019-06-28 NOTE — PROGRESS NOTES
[facility]    Date of consult: 6/28/2019    CC: visual problem, head and eye pain    HPI: Silvio Araujo is a 58year old female with past medical history as listed below presents here for initial evaluation of visual pro right ovarian removal for cyst     Social History:  Social History    Tobacco Use      Smoking status: Never Smoker      Smokeless tobacco: Never Used    Alcohol use: No      Frequency: Never    Family History   Problem Relation Age of Onset   • Heart D advised to go ER for any new or worsening symptoms and contact office for above tests' results, any possible side effects from medication or other concerns.     Kirsten Mann MD   Neurology  [facility]  6/28/2019, 10:04 AM  [place]

## 2019-06-28 NOTE — PROGRESS NOTES
Pt reports pt reports blurriness in right eye with some to a lesser degree in left eye. Pt reports she was told she has \"macular puck\" in both left and right eye.  Headache on right orbital bone that extends around right side to occipital are, pt also rep

## 2019-07-30 DIAGNOSIS — E78.2 MIXED HYPERLIPIDEMIA: ICD-10-CM

## 2019-07-30 DIAGNOSIS — E11.9 TYPE 2 DIABETES MELLITUS WITHOUT COMPLICATION, WITHOUT LONG-TERM CURRENT USE OF INSULIN (HCC): ICD-10-CM

## 2019-07-31 RX ORDER — ACETAMINOPHEN 500 MG
TABLET ORAL
Qty: 90 TABLET | Refills: 0 | OUTPATIENT
Start: 2019-07-31

## 2019-07-31 NOTE — TELEPHONE ENCOUNTER
Acetaminophen 500 mg oral tab    Last OV relevant to medication: 2/13/2019    Last refill date: 06/25/2019     #/refills: #90 w/ 0 refills     When pt was asked to return for OV: 3 months    Upcoming appt/reason: No future appointments

## 2019-08-28 ENCOUNTER — TELEPHONE (OUTPATIENT)
Dept: RHEUMATOLOGY | Facility: CLINIC | Age: 64
End: 2019-08-28

## 2020-07-24 ENCOUNTER — LAB ENCOUNTER (OUTPATIENT)
Dept: LAB | Age: 65
End: 2020-07-24
Attending: INTERNAL MEDICINE
Payer: MEDICARE

## 2020-07-24 ENCOUNTER — OFFICE VISIT (OUTPATIENT)
Dept: INTERNAL MEDICINE CLINIC | Facility: CLINIC | Age: 65
End: 2020-07-24
Payer: MEDICARE

## 2020-07-24 VITALS
SYSTOLIC BLOOD PRESSURE: 128 MMHG | HEART RATE: 62 BPM | DIASTOLIC BLOOD PRESSURE: 66 MMHG | TEMPERATURE: 98 F | HEIGHT: 62 IN | RESPIRATION RATE: 16 BRPM | OXYGEN SATURATION: 95 % | WEIGHT: 171 LBS | BODY MASS INDEX: 31.47 KG/M2

## 2020-07-24 DIAGNOSIS — E78.5 HYPERLIPIDEMIA ASSOCIATED WITH TYPE 2 DIABETES MELLITUS (HCC): ICD-10-CM

## 2020-07-24 DIAGNOSIS — Z12.31 VISIT FOR SCREENING MAMMOGRAM: ICD-10-CM

## 2020-07-24 DIAGNOSIS — Z12.11 SCREEN FOR COLON CANCER: ICD-10-CM

## 2020-07-24 DIAGNOSIS — M25.50 ARTHRALGIA, UNSPECIFIED JOINT: ICD-10-CM

## 2020-07-24 DIAGNOSIS — Z00.00 ROUTINE GENERAL MEDICAL EXAMINATION AT A HEALTH CARE FACILITY: Primary | ICD-10-CM

## 2020-07-24 DIAGNOSIS — Z78.0 POSTMENOPAUSAL: ICD-10-CM

## 2020-07-24 DIAGNOSIS — E11.9 TYPE 2 DIABETES MELLITUS WITHOUT COMPLICATION, WITHOUT LONG-TERM CURRENT USE OF INSULIN (HCC): ICD-10-CM

## 2020-07-24 DIAGNOSIS — E11.69 HYPERLIPIDEMIA ASSOCIATED WITH TYPE 2 DIABETES MELLITUS (HCC): ICD-10-CM

## 2020-07-24 DIAGNOSIS — K51.919 ULCERATIVE COLITIS WITH COMPLICATION, UNSPECIFIED LOCATION (HCC): ICD-10-CM

## 2020-07-24 LAB
ALT SERPL-CCNC: 24 U/L (ref 13–56)
ANION GAP SERPL CALC-SCNC: 2 MMOL/L (ref 0–18)
AST SERPL-CCNC: 14 U/L (ref 15–37)
BASOPHILS # BLD AUTO: 0.03 X10(3) UL (ref 0–0.2)
BASOPHILS NFR BLD AUTO: 0.5 %
BUN BLD-MCNC: 15 MG/DL (ref 7–18)
BUN/CREAT SERPL: 20.3 (ref 10–20)
CALCIUM BLD-MCNC: 9.4 MG/DL (ref 8.5–10.1)
CHLORIDE SERPL-SCNC: 105 MMOL/L (ref 98–112)
CHOLEST SMN-MCNC: 219 MG/DL (ref ?–200)
CO2 SERPL-SCNC: 29 MMOL/L (ref 21–32)
CREAT BLD-MCNC: 0.74 MG/DL (ref 0.55–1.02)
CREAT UR-SCNC: 33.7 MG/DL
CRP SERPL HS-MCNC: 7.01 MG/L (ref ?–3)
DEPRECATED RDW RBC AUTO: 45.1 FL (ref 35.1–46.3)
EOSINOPHIL # BLD AUTO: 0.09 X10(3) UL (ref 0–0.7)
EOSINOPHIL NFR BLD AUTO: 1.4 %
ERYTHROCYTE [DISTWIDTH] IN BLOOD BY AUTOMATED COUNT: 12.8 % (ref 11–15)
EST. AVERAGE GLUCOSE BLD GHB EST-MCNC: 137 MG/DL (ref 68–126)
GLUCOSE BLD-MCNC: 127 MG/DL (ref 70–99)
HBA1C MFR BLD HPLC: 6.4 % (ref ?–5.7)
HCT VFR BLD AUTO: 49.4 % (ref 35–48)
HDLC SERPL-MCNC: 35 MG/DL (ref 40–59)
HGB BLD-MCNC: 15.4 G/DL (ref 12–16)
IMM GRANULOCYTES # BLD AUTO: 0.01 X10(3) UL (ref 0–1)
IMM GRANULOCYTES NFR BLD: 0.2 %
LDLC SERPL CALC-MCNC: 140 MG/DL (ref ?–100)
LYMPHOCYTES # BLD AUTO: 2.26 X10(3) UL (ref 1–4)
LYMPHOCYTES NFR BLD AUTO: 35.3 %
MCH RBC QN AUTO: 29.6 PG (ref 26–34)
MCHC RBC AUTO-ENTMCNC: 31.2 G/DL (ref 31–37)
MCV RBC AUTO: 94.8 FL (ref 80–100)
MICROALBUMIN UR-MCNC: 1.66 MG/DL
MICROALBUMIN/CREAT 24H UR-RTO: 49.3 UG/MG (ref ?–30)
MONOCYTES # BLD AUTO: 0.39 X10(3) UL (ref 0.1–1)
MONOCYTES NFR BLD AUTO: 6.1 %
NEUTROPHILS # BLD AUTO: 3.63 X10 (3) UL (ref 1.5–7.7)
NEUTROPHILS # BLD AUTO: 3.63 X10(3) UL (ref 1.5–7.7)
NEUTROPHILS NFR BLD AUTO: 56.5 %
NONHDLC SERPL-MCNC: 184 MG/DL (ref ?–130)
OSMOLALITY SERPL CALC.SUM OF ELEC: 284 MOSM/KG (ref 275–295)
PATIENT FASTING Y/N/NP: YES
PATIENT FASTING Y/N/NP: YES
PLATELET # BLD AUTO: 242 10(3)UL (ref 150–450)
POTASSIUM SERPL-SCNC: 4.1 MMOL/L (ref 3.5–5.1)
RBC # BLD AUTO: 5.21 X10(6)UL (ref 3.8–5.3)
SED RATE-ML: 10 MM/HR (ref 0–25)
SODIUM SERPL-SCNC: 136 MMOL/L (ref 136–145)
TRIGL SERPL-MCNC: 218 MG/DL (ref 30–149)
VLDLC SERPL CALC-MCNC: 44 MG/DL (ref 0–30)
WBC # BLD AUTO: 6.4 X10(3) UL (ref 4–11)

## 2020-07-24 PROCEDURE — 80048 BASIC METABOLIC PNL TOTAL CA: CPT

## 2020-07-24 PROCEDURE — 84450 TRANSFERASE (AST) (SGOT): CPT

## 2020-07-24 PROCEDURE — 82570 ASSAY OF URINE CREATININE: CPT

## 2020-07-24 PROCEDURE — 36415 COLL VENOUS BLD VENIPUNCTURE: CPT

## 2020-07-24 PROCEDURE — G0438 PPPS, INITIAL VISIT: HCPCS | Performed by: INTERNAL MEDICINE

## 2020-07-24 PROCEDURE — 85652 RBC SED RATE AUTOMATED: CPT

## 2020-07-24 PROCEDURE — 99214 OFFICE O/P EST MOD 30 MIN: CPT | Performed by: INTERNAL MEDICINE

## 2020-07-24 PROCEDURE — 82043 UR ALBUMIN QUANTITATIVE: CPT

## 2020-07-24 PROCEDURE — 84460 ALANINE AMINO (ALT) (SGPT): CPT

## 2020-07-24 PROCEDURE — 80061 LIPID PANEL: CPT

## 2020-07-24 PROCEDURE — 85025 COMPLETE CBC W/AUTO DIFF WBC: CPT

## 2020-07-24 PROCEDURE — 83036 HEMOGLOBIN GLYCOSYLATED A1C: CPT

## 2020-07-24 PROCEDURE — 86141 C-REACTIVE PROTEIN HS: CPT

## 2020-07-24 NOTE — PATIENT INSTRUCTIONS
Please have a diabetic eye exam as soon as possible. Please call central scheduling at [phone] to schedule your labs, mammogram, and bone scan (DEXA). Please do not exceed 3000 mg (3 grams) of tylenol in 24 hours.    It is very important you l

## 2020-07-24 NOTE — PROGRESS NOTES
Cassandra Joel is a 71year old female. HPI:   Patient presents for medicare supervisit and additional issues noted below. Has not followed up as advised. States she does not know why she has not followed up. Overdue for labs.    1. DM: has not History:   Procedure Laterality Date   • COLONOSCOPY  07/17/2018   • OOPHORECTOMY Right 1994    cyst   • OTHER SURGICAL HISTORY  1998    right ovarian removal for cyst      Social History:    Social History    Tobacco Use      Smoking status: Never Smoker outlined small, achievable goals. - DM eye exam on 12/12/18 by Baltazar Leiva negative for diabetic retinopathy b/l. Overdue for exam, referral ordered  - Foot Exam: 7/2020, educated on nightly foot exams  - LDL: repeat lipids.  She is open to starting choles eye exam on 8/3/2020 by Wilner Nicole, OD: no diabetic retinopathy b/l    The patient indicates understanding of these issues and agrees to the plan. The patient is asked to return in 3 months, earlier pending labs.    Cristopher Macedo MD

## 2020-08-25 ENCOUNTER — OFFICE VISIT (OUTPATIENT)
Dept: RHEUMATOLOGY | Facility: CLINIC | Age: 65
End: 2020-08-25
Payer: MEDICARE

## 2020-08-25 VITALS
HEART RATE: 72 BPM | RESPIRATION RATE: 16 BRPM | DIASTOLIC BLOOD PRESSURE: 80 MMHG | HEIGHT: 63 IN | SYSTOLIC BLOOD PRESSURE: 130 MMHG | WEIGHT: 172.38 LBS | BODY MASS INDEX: 30.54 KG/M2 | TEMPERATURE: 98 F

## 2020-08-25 DIAGNOSIS — M79.641 BILATERAL HAND PAIN: ICD-10-CM

## 2020-08-25 DIAGNOSIS — M79.642 BILATERAL HAND PAIN: ICD-10-CM

## 2020-08-25 DIAGNOSIS — M25.50 POLYARTHRALGIA: Primary | ICD-10-CM

## 2020-08-25 DIAGNOSIS — G89.29 CHRONIC MIDLINE LOW BACK PAIN WITHOUT SCIATICA: ICD-10-CM

## 2020-08-25 DIAGNOSIS — M15.9 PRIMARY OSTEOARTHRITIS INVOLVING MULTIPLE JOINTS: ICD-10-CM

## 2020-08-25 DIAGNOSIS — M19.049 CMC ARTHRITIS: ICD-10-CM

## 2020-08-25 DIAGNOSIS — M79.10 MYALGIA: ICD-10-CM

## 2020-08-25 DIAGNOSIS — M54.50 CHRONIC MIDLINE LOW BACK PAIN WITHOUT SCIATICA: ICD-10-CM

## 2020-08-25 PROCEDURE — 99214 OFFICE O/P EST MOD 30 MIN: CPT | Performed by: INTERNAL MEDICINE

## 2020-08-25 NOTE — PROGRESS NOTES
?  RHEUMATOLOGY Follow up   Date of visit: 8/25/2020  ? Patient presents with: Follow - Up: one year. Feeling the same pain. Thumbs have started hurting and hands get numb. Left and arm is hurting.  Right leg as well but not as much as the left      ASSES that hsCRP is used more as a cardiovascular risk assessment and not specific for the joints. Levels >10 can be associated with systemic inflammation.  However levels <10 can be associated with CV risk, psychosocial stress, poor sleep habits and other factor Symptoms/pain is worse in the mornings. Improved with activity. Feels is works itself out after about 30 minutes. Improved with warm water. But states left leg pain and thumb pain present throughout the day.    Continues with right sided headaches, follow some blurred vision of the left eye but not as severe. Admits to significant headache on the right side from scalp down the back of the head and into the neck.  States the headaches started in March/April, initially intermittent in nature but has been more autoimmune disease. Mother with \"heavy arthritis. \" Brother with gout. ?   Past Medical History:  Past Medical History:   Diagnosis Date   • Pre-diabetes      Past Surgical History:  Past Surgical History:   Procedure Laterality Date   • COLONOSCOPY  0 for chest pain, palpitations and leg swelling. Gastrointestinal: Negative for abdominal pain, heartburn and nausea. Genitourinary: Negative for dysuria, frequency and urgency. Musculoskeletal: Positive for back pain and joint pain.  Negative for neck No spinous process tenderness with exception of lower lumbar region.    Bilateral knees with medial joint line tenderness, mild crepitus, no effusion.  (previous exam)  Posterior Tender Point Exam  Occiput +/+  Trapezius +/+  Supraspinatus +/+  Gluteal def year with no known injury. FINDINGS:  No fracture or dislocation. Moderate 1st carpometacarpal osteoarthritic changes. Joint spaces are otherwise relatively well maintained.      IMPRESSION:  Moderate to marked 1st carpometacarpal osteoarthritic mateus 24 07/24/2020     07/24/2020    K 4.1 07/24/2020     07/24/2020    CO2 29.0 07/24/2020       Additional Labs:  07/2020  ESR normal  hsCRP 7.01 (elevated cardiovascular risk)    06/2019  ESR normal  CRP 0.31 borderline  Uric acid 4.3 normal  HLA

## 2020-08-31 ENCOUNTER — OFFICE VISIT (OUTPATIENT)
Dept: INTERNAL MEDICINE CLINIC | Facility: CLINIC | Age: 65
End: 2020-08-31
Payer: MEDICARE

## 2020-08-31 ENCOUNTER — TELEPHONE (OUTPATIENT)
Dept: INTERNAL MEDICINE CLINIC | Facility: CLINIC | Age: 65
End: 2020-08-31

## 2020-08-31 VITALS
RESPIRATION RATE: 16 BRPM | BODY MASS INDEX: 30.86 KG/M2 | HEART RATE: 80 BPM | WEIGHT: 174.19 LBS | SYSTOLIC BLOOD PRESSURE: 120 MMHG | HEIGHT: 63 IN | OXYGEN SATURATION: 99 % | DIASTOLIC BLOOD PRESSURE: 80 MMHG | TEMPERATURE: 98 F

## 2020-08-31 DIAGNOSIS — E78.5 HYPERLIPIDEMIA ASSOCIATED WITH TYPE 2 DIABETES MELLITUS (HCC): Primary | ICD-10-CM

## 2020-08-31 DIAGNOSIS — E11.9 TYPE 2 DIABETES MELLITUS WITHOUT COMPLICATION, WITHOUT LONG-TERM CURRENT USE OF INSULIN (HCC): ICD-10-CM

## 2020-08-31 DIAGNOSIS — H35.373 MACULAR PUCKER OF BOTH EYES: ICD-10-CM

## 2020-08-31 DIAGNOSIS — M25.50 POLYARTHRALGIA: ICD-10-CM

## 2020-08-31 DIAGNOSIS — E11.69 HYPERLIPIDEMIA ASSOCIATED WITH TYPE 2 DIABETES MELLITUS (HCC): Primary | ICD-10-CM

## 2020-08-31 PROBLEM — E11.29 TYPE 2 DIABETES MELLITUS WITH ALBUMINURIA (HCC): Status: ACTIVE | Noted: 2019-02-13

## 2020-08-31 PROBLEM — R80.9 TYPE 2 DIABETES MELLITUS WITH ALBUMINURIA (HCC): Status: ACTIVE | Noted: 2019-02-13

## 2020-08-31 PROBLEM — E11.29 TYPE 2 DIABETES MELLITUS WITH ALBUMINURIA: Status: ACTIVE | Noted: 2019-02-13

## 2020-08-31 PROBLEM — R80.9 TYPE 2 DIABETES MELLITUS WITH ALBUMINURIA: Status: ACTIVE | Noted: 2019-02-13

## 2020-08-31 PROCEDURE — 93000 ELECTROCARDIOGRAM COMPLETE: CPT | Performed by: INTERNAL MEDICINE

## 2020-08-31 PROCEDURE — 99214 OFFICE O/P EST MOD 30 MIN: CPT | Performed by: INTERNAL MEDICINE

## 2020-08-31 RX ORDER — ATORVASTATIN CALCIUM 10 MG/1
10 TABLET, FILM COATED ORAL DAILY
Qty: 90 TABLET | Refills: 0 | Status: SHIPPED | OUTPATIENT
Start: 2020-08-31 | End: 2020-11-30

## 2020-08-31 NOTE — PROGRESS NOTES
Ashlye Mitchell is a 76year old female. HPI:   Patient presents for the following issues:   1. HLP: has tolerating lipitor in past. Lipids are high right now  2. Albuminuria: new  3. High hsCRP: will check CT calcium score  4.  Needs bilateral v Never Used    Alcohol use: No      Frequency: Never    Drug use:  No             08/31/20  1530   BP: 136/80   Pulse: 80   Resp: 16   Temp: 98.3 °F (36.8 °C)   GENERAL: A&O well developed, well nourished,i obese, in no apparent distress  SKIN: no rashes,no LDL: see below  - BP: controlled without meds  - micro alb:creat > 30 in 8/2020, see below  - Depression Screen: July 2020  - not on ASA  # HLP assoc with DM: she will start atorvastatin. Side effects discussed.    # Albuminuria 2/2 DM: if persists, will ne

## 2020-08-31 NOTE — TELEPHONE ENCOUNTER
[address] to obtain pts immunization record for pneumovac 23 and prevnar 13 vaccines.      Spoke with pharmacist   And he stated that he will have it sent to our office

## 2020-08-31 NOTE — PATIENT INSTRUCTIONS
I advise you get the annual flu shot every September, October. Please call central scheduling at [phone] to schedule your mammogram and bone scan as soon as possible. Please remember you are also over-due for a colonoscopy.     Please start anila

## 2020-09-01 ENCOUNTER — TELEPHONE (OUTPATIENT)
Dept: INTERNAL MEDICINE CLINIC | Facility: CLINIC | Age: 65
End: 2020-09-01

## 2020-09-01 NOTE — TELEPHONE ENCOUNTER
Pre op clearance done on 08/31/2020   H&P and EKG placed in blue pre-op folder on my desk   Awaiting cbc lab work

## 2020-09-05 ENCOUNTER — LAB ENCOUNTER (OUTPATIENT)
Dept: LAB | Age: 65
End: 2020-09-05
Attending: INTERNAL MEDICINE
Payer: MEDICARE

## 2020-09-05 DIAGNOSIS — E78.5 HYPERLIPIDEMIA ASSOCIATED WITH TYPE 2 DIABETES MELLITUS (HCC): ICD-10-CM

## 2020-09-05 DIAGNOSIS — E11.69 HYPERLIPIDEMIA ASSOCIATED WITH TYPE 2 DIABETES MELLITUS (HCC): ICD-10-CM

## 2020-09-05 DIAGNOSIS — H35.373 MACULAR PUCKER OF BOTH EYES: ICD-10-CM

## 2020-09-05 LAB
ALT SERPL-CCNC: 23 U/L (ref 13–56)
AST SERPL-CCNC: 17 U/L (ref 15–37)
BASOPHILS # BLD AUTO: 0.03 X10(3) UL (ref 0–0.2)
BASOPHILS NFR BLD AUTO: 0.5 %
CHOLEST SMN-MCNC: 188 MG/DL (ref ?–200)
DEPRECATED RDW RBC AUTO: 43.8 FL (ref 35.1–46.3)
EOSINOPHIL # BLD AUTO: 0.12 X10(3) UL (ref 0–0.7)
EOSINOPHIL NFR BLD AUTO: 1.9 %
ERYTHROCYTE [DISTWIDTH] IN BLOOD BY AUTOMATED COUNT: 12.6 % (ref 11–15)
HCT VFR BLD AUTO: 49 % (ref 35–48)
HDLC SERPL-MCNC: 39 MG/DL (ref 40–59)
HGB BLD-MCNC: 15.3 G/DL (ref 12–16)
IMM GRANULOCYTES # BLD AUTO: 0.02 X10(3) UL (ref 0–1)
IMM GRANULOCYTES NFR BLD: 0.3 %
LDLC SERPL CALC-MCNC: 112 MG/DL (ref ?–100)
LYMPHOCYTES # BLD AUTO: 1.94 X10(3) UL (ref 1–4)
LYMPHOCYTES NFR BLD AUTO: 31.4 %
MCH RBC QN AUTO: 29.5 PG (ref 26–34)
MCHC RBC AUTO-ENTMCNC: 31.2 G/DL (ref 31–37)
MCV RBC AUTO: 94.6 FL (ref 80–100)
MONOCYTES # BLD AUTO: 0.49 X10(3) UL (ref 0.1–1)
MONOCYTES NFR BLD AUTO: 7.9 %
NEUTROPHILS # BLD AUTO: 3.57 X10 (3) UL (ref 1.5–7.7)
NEUTROPHILS # BLD AUTO: 3.57 X10(3) UL (ref 1.5–7.7)
NEUTROPHILS NFR BLD AUTO: 58 %
NONHDLC SERPL-MCNC: 149 MG/DL (ref ?–130)
PATIENT FASTING Y/N/NP: YES
PLATELET # BLD AUTO: 232 10(3)UL (ref 150–450)
RBC # BLD AUTO: 5.18 X10(6)UL (ref 3.8–5.3)
TRIGL SERPL-MCNC: 187 MG/DL (ref 30–149)
VLDLC SERPL CALC-MCNC: 37 MG/DL (ref 0–30)
WBC # BLD AUTO: 6.2 X10(3) UL (ref 4–11)

## 2020-09-05 PROCEDURE — 80061 LIPID PANEL: CPT

## 2020-09-05 PROCEDURE — 84450 TRANSFERASE (AST) (SGOT): CPT

## 2020-09-05 PROCEDURE — 36415 COLL VENOUS BLD VENIPUNCTURE: CPT

## 2020-09-05 PROCEDURE — 84460 ALANINE AMINO (ALT) (SGPT): CPT

## 2020-09-05 PROCEDURE — 85025 COMPLETE CBC W/AUTO DIFF WBC: CPT

## 2020-09-08 ENCOUNTER — TELEPHONE (OUTPATIENT)
Dept: INTERNAL MEDICINE CLINIC | Facility: CLINIC | Age: 65
End: 2020-09-08

## 2020-09-08 DIAGNOSIS — E78.5 HYPERLIPIDEMIA ASSOCIATED WITH TYPE 2 DIABETES MELLITUS (HCC): Primary | ICD-10-CM

## 2020-09-08 DIAGNOSIS — E11.69 HYPERLIPIDEMIA ASSOCIATED WITH TYPE 2 DIABETES MELLITUS (HCC): Primary | ICD-10-CM

## 2020-09-08 NOTE — TELEPHONE ENCOUNTER
Received patient immunization form from [facility]. Patient received Fluzone HD Quad + Boostrix ( Tdap) on 9/3/20. Immunization records have been updated. Form send to scan.

## 2020-09-08 NOTE — TELEPHONE ENCOUNTER
Faxed labs, H&P, and EKG to Dr. Monroe Daley at [facility] number [phone]     Fax confirmation received     Sent EKG to scan and copies placed in accordion

## 2020-09-14 ENCOUNTER — LAB REQUISITION (OUTPATIENT)
Dept: LAB | Facility: HOSPITAL | Age: 65
End: 2020-09-14
Payer: MEDICARE

## 2020-09-14 DIAGNOSIS — Z20.828 CONTACT WITH AND (SUSPECTED) EXPOSURE TO OTHER VIRAL COMMUNICABLE DISEASES: ICD-10-CM

## 2020-09-17 LAB — SARS-COV-2 RNA RESP QL NAA+PROBE: NOT DETECTED

## 2020-10-22 ENCOUNTER — LAB ENCOUNTER (OUTPATIENT)
Dept: LAB | Age: 65
End: 2020-10-22
Attending: INTERNAL MEDICINE
Payer: MEDICARE

## 2020-10-22 ENCOUNTER — OFFICE VISIT (OUTPATIENT)
Dept: INTERNAL MEDICINE CLINIC | Facility: CLINIC | Age: 65
End: 2020-10-22
Payer: MEDICARE

## 2020-10-22 VITALS
RESPIRATION RATE: 16 BRPM | BODY MASS INDEX: 30.51 KG/M2 | HEIGHT: 63 IN | TEMPERATURE: 99 F | HEART RATE: 76 BPM | SYSTOLIC BLOOD PRESSURE: 124 MMHG | DIASTOLIC BLOOD PRESSURE: 78 MMHG | WEIGHT: 172.19 LBS

## 2020-10-22 DIAGNOSIS — H35.30 MACULAR DEGENERATION OF BOTH EYES, UNSPECIFIED TYPE: ICD-10-CM

## 2020-10-22 DIAGNOSIS — Z01.818 PREOPERATIVE EXAMINATION: ICD-10-CM

## 2020-10-22 DIAGNOSIS — Z01.818 PREOPERATIVE EXAMINATION: Primary | ICD-10-CM

## 2020-10-22 DIAGNOSIS — E78.5 HYPERLIPIDEMIA ASSOCIATED WITH TYPE 2 DIABETES MELLITUS (HCC): ICD-10-CM

## 2020-10-22 DIAGNOSIS — K51.919 ULCERATIVE COLITIS WITH COMPLICATION, UNSPECIFIED LOCATION (HCC): ICD-10-CM

## 2020-10-22 DIAGNOSIS — H35.373 MACULAR PUCKER OF BOTH EYES: ICD-10-CM

## 2020-10-22 DIAGNOSIS — E11.69 HYPERLIPIDEMIA ASSOCIATED WITH TYPE 2 DIABETES MELLITUS (HCC): ICD-10-CM

## 2020-10-22 DIAGNOSIS — E11.29 TYPE 2 DIABETES MELLITUS WITH ALBUMINURIA (HCC): ICD-10-CM

## 2020-10-22 DIAGNOSIS — R80.9 TYPE 2 DIABETES MELLITUS WITH ALBUMINURIA (HCC): ICD-10-CM

## 2020-10-22 PROCEDURE — 99214 OFFICE O/P EST MOD 30 MIN: CPT | Performed by: INTERNAL MEDICINE

## 2020-10-22 PROCEDURE — 36415 COLL VENOUS BLD VENIPUNCTURE: CPT

## 2020-10-22 PROCEDURE — 85025 COMPLETE CBC W/AUTO DIFF WBC: CPT

## 2020-10-22 NOTE — PROGRESS NOTES
Anderson Adams is a 76year old female who presents for a pre-operative physical exam.   Anderson Adams is scheduled for a pars vitrectomy procedure at [facility] on 11/5/20 performed by Dr. Kathie Massey, who has requested that I provide Oral Cap, Take 1 capsule (1 g total) by mouth daily. , Disp: 90 capsule, Rfl: 3  •  Misc Natural Products (OSTEO BI-FLEX/5-LOXIN ADVANCED) Oral Tab, Take 1 tablet by mouth daily. , Disp: 90 tablet, Rfl: 3  •  Ascorbic Acid (VITAMIN C) 100 MG Oral Tab, Take 1 event.  She is considered a low to intermediate risk patient undergoing an intermediate risk procedure, and is ok to proceed with surgery.   Note and pertinent pre-operative testing results will be faxed to referring surgeon's office and/or surgical center

## 2020-10-22 NOTE — PATIENT INSTRUCTIONS
- Get CBC blood test done (do not have to fast)  - Otherwise you are ok to proceed with surgery  - Stop all over the counter medications (except Tylenol) starting on 10/29/20. It was a pleasure seeing you in the clinic today.   Thank you for choosing the

## 2020-10-23 ENCOUNTER — TELEPHONE (OUTPATIENT)
Dept: INTERNAL MEDICINE CLINIC | Facility: CLINIC | Age: 65
End: 2020-10-23

## 2020-10-23 NOTE — TELEPHONE ENCOUNTER
Faxed preop note from 10/22, EKG and labwork to [phone]-Faxed preop note from 10/22, EKG and labwork to [phone]

## 2020-11-02 ENCOUNTER — LAB REQUISITION (OUTPATIENT)
Dept: LAB | Facility: HOSPITAL | Age: 65
End: 2020-11-02
Payer: MEDICARE

## 2020-11-02 DIAGNOSIS — Z01.818 ENCOUNTER FOR OTHER PREPROCEDURAL EXAMINATION: ICD-10-CM

## 2020-11-03 ENCOUNTER — TELEPHONE (OUTPATIENT)
Dept: INTERNAL MEDICINE CLINIC | Facility: CLINIC | Age: 65
End: 2020-11-03

## 2020-11-04 NOTE — TELEPHONE ENCOUNTER
Notes faxed to # below. Fax not going through. Contacted office for different fax. Fax # provided: [phone]. Sent fax . Also pts COVID test abnormal (Dr Jennifer Stern aware pt was positive). Informed surgery office.

## 2020-11-04 NOTE — TELEPHONE ENCOUNTER
Fax did not go through to that number. Contacted office and they advised to fax to [phone]. Faxed and received confirmation.

## 2020-11-12 ENCOUNTER — TELEPHONE (OUTPATIENT)
Dept: INTERNAL MEDICINE CLINIC | Facility: CLINIC | Age: 65
End: 2020-11-12

## 2020-11-12 NOTE — TELEPHONE ENCOUNTER
Pt was told she was positive for covid-19 on Nov. 2nd, pt was instructed to quarantine till 11/16/20, pt was supposed to have eye surgery on 11/05/20 but was moved to 12/03/20 with dr. Saurabh Estrada due to the covid-19 situation.  Pt needs a pre op or clearance

## 2020-11-13 NOTE — TELEPHONE ENCOUNTER
I can just addend my pre-op note from last visit with updated clearance as long as that is ok with Dr. Eunice Kelsey.

## 2020-11-13 NOTE — TELEPHONE ENCOUNTER
Spoke with patient notified if okay with Dr. Jahaira Reynoso can addend previous preop note. Patient verbalized understanding and agreeable to POC. Ph: [phone] Dr. Jovanni Urban office phone number.

## 2020-11-24 NOTE — TELEPHONE ENCOUNTER
I spoke with Nicole Linton at Dr Zaria Manzo office whom states that addendum to [address] notes from 10/22/20 will be acceptable for clearance. Please addend.

## 2020-11-28 DIAGNOSIS — E11.69 HYPERLIPIDEMIA ASSOCIATED WITH TYPE 2 DIABETES MELLITUS (HCC): ICD-10-CM

## 2020-11-28 DIAGNOSIS — E78.5 HYPERLIPIDEMIA ASSOCIATED WITH TYPE 2 DIABETES MELLITUS (HCC): ICD-10-CM

## 2020-11-30 RX ORDER — ATORVASTATIN CALCIUM 10 MG/1
TABLET, FILM COATED ORAL
Qty: 90 TABLET | Refills: 0 | Status: SHIPPED | OUTPATIENT
Start: 2020-11-30 | End: 2021-02-25

## 2020-11-30 NOTE — TELEPHONE ENCOUNTER
Medication(s) to Refill:   Requested Prescriptions     Pending Prescriptions Disp Refills   • ATORVASTATIN 10 MG Oral Tab [Pharmacy Med Name: ATORVASTATIN 10 MG TABLET] 90 tablet 0     Sig: TAKE ONE TABLET BY MOUTH DAILY       LOV:  10-    RTC:  4 m

## 2021-02-24 DIAGNOSIS — E78.5 HYPERLIPIDEMIA ASSOCIATED WITH TYPE 2 DIABETES MELLITUS (HCC): ICD-10-CM

## 2021-02-24 DIAGNOSIS — E11.69 HYPERLIPIDEMIA ASSOCIATED WITH TYPE 2 DIABETES MELLITUS (HCC): ICD-10-CM

## 2021-02-25 DIAGNOSIS — E11.29 TYPE 2 DIABETES MELLITUS WITH ALBUMINURIA (HCC): Primary | ICD-10-CM

## 2021-02-25 DIAGNOSIS — E78.5 HYPERLIPIDEMIA ASSOCIATED WITH TYPE 2 DIABETES MELLITUS (HCC): ICD-10-CM

## 2021-02-25 DIAGNOSIS — E11.69 HYPERLIPIDEMIA ASSOCIATED WITH TYPE 2 DIABETES MELLITUS (HCC): ICD-10-CM

## 2021-02-25 DIAGNOSIS — R80.9 TYPE 2 DIABETES MELLITUS WITH ALBUMINURIA (HCC): Primary | ICD-10-CM

## 2021-02-25 RX ORDER — ATORVASTATIN CALCIUM 10 MG/1
TABLET, FILM COATED ORAL
Qty: 90 TABLET | Refills: 0 | Status: SHIPPED | OUTPATIENT
Start: 2021-02-25 | End: 2021-03-02

## 2021-02-25 NOTE — TELEPHONE ENCOUNTER
LOV: 10/22/2020 with Dr. Christa Joyner   RTC: 4 months  Last Relevant Labs: Sept/Oct 2020  Filled: 11/30/2020   #90 with 0 refills    No future appointments.

## 2021-02-27 DIAGNOSIS — E11.69 HYPERLIPIDEMIA ASSOCIATED WITH TYPE 2 DIABETES MELLITUS (HCC): ICD-10-CM

## 2021-02-27 DIAGNOSIS — E78.5 HYPERLIPIDEMIA ASSOCIATED WITH TYPE 2 DIABETES MELLITUS (HCC): ICD-10-CM

## 2021-03-02 RX ORDER — ATORVASTATIN CALCIUM 10 MG/1
TABLET, FILM COATED ORAL
Qty: 90 TABLET | Refills: 0 | Status: SHIPPED | OUTPATIENT
Start: 2021-03-02 | End: 2021-04-22

## 2021-03-02 NOTE — TELEPHONE ENCOUNTER
Protocol passed   Medication(s) to Refill:   Requested Prescriptions     Pending Prescriptions Disp Refills   • ATORVASTATIN 10 MG Oral Tab [Pharmacy Med Name: ATORVASTATIN 10 MG TABLET] 90 tablet 0     Sig: TAKE ONE TABLET BY MOUTH DAILY       LOV: 10/22/

## 2021-03-04 DIAGNOSIS — Z23 NEED FOR VACCINATION: ICD-10-CM

## 2021-03-31 ENCOUNTER — TELEPHONE (OUTPATIENT)
Dept: INTERNAL MEDICINE CLINIC | Facility: CLINIC | Age: 66
End: 2021-03-31

## 2021-03-31 DIAGNOSIS — R80.9 TYPE 2 DIABETES MELLITUS WITH ALBUMINURIA (HCC): Primary | ICD-10-CM

## 2021-03-31 DIAGNOSIS — H25.9 AGE-RELATED CATARACT OF BOTH EYES, UNSPECIFIED AGE-RELATED CATARACT TYPE: ICD-10-CM

## 2021-03-31 DIAGNOSIS — E11.29 TYPE 2 DIABETES MELLITUS WITH ALBUMINURIA (HCC): Primary | ICD-10-CM

## 2021-03-31 NOTE — TELEPHONE ENCOUNTER
Referral needed to opth - Previous referral placed prior to ins change    New [employer] Ins, effective 3/1/21 updated in Epic, card NOT available for scanning     Dr. Singh Lipoma  Bilateral cataract + DM    Pt phone - [phone]    Future Appoin

## 2021-04-21 ENCOUNTER — HOSPITAL ENCOUNTER (OUTPATIENT)
Dept: GENERAL RADIOLOGY | Age: 66
Discharge: HOME | End: 2021-04-21
Attending: INTERNAL MEDICINE
Payer: MEDICARE

## 2021-04-21 ENCOUNTER — OFFICE VISIT (OUTPATIENT)
Dept: INTERNAL MEDICINE CLINIC | Facility: CLINIC | Age: 66
End: 2021-04-21
Payer: MEDICARE

## 2021-04-21 ENCOUNTER — LAB ENCOUNTER (OUTPATIENT)
Dept: LAB | Age: 66
End: 2021-04-21
Attending: INTERNAL MEDICINE
Payer: MEDICARE

## 2021-04-21 VITALS
BODY MASS INDEX: 31.33 KG/M2 | SYSTOLIC BLOOD PRESSURE: 115 MMHG | OXYGEN SATURATION: 99 % | HEART RATE: 73 BPM | TEMPERATURE: 98 F | WEIGHT: 176.81 LBS | RESPIRATION RATE: 16 BRPM | DIASTOLIC BLOOD PRESSURE: 80 MMHG | HEIGHT: 63 IN

## 2021-04-21 DIAGNOSIS — R80.9 TYPE 2 DIABETES MELLITUS WITH ALBUMINURIA (HCC): ICD-10-CM

## 2021-04-21 DIAGNOSIS — M25.561 CHRONIC PAIN OF BOTH KNEES: ICD-10-CM

## 2021-04-21 DIAGNOSIS — M25.562 CHRONIC PAIN OF BOTH KNEES: ICD-10-CM

## 2021-04-21 DIAGNOSIS — E11.29 TYPE 2 DIABETES MELLITUS WITH ALBUMINURIA (HCC): ICD-10-CM

## 2021-04-21 DIAGNOSIS — R80.9 TYPE 2 DIABETES MELLITUS WITH ALBUMINURIA (HCC): Primary | ICD-10-CM

## 2021-04-21 DIAGNOSIS — E11.69 HYPERLIPIDEMIA ASSOCIATED WITH TYPE 2 DIABETES MELLITUS (HCC): ICD-10-CM

## 2021-04-21 DIAGNOSIS — E11.29 TYPE 2 DIABETES MELLITUS WITH ALBUMINURIA (HCC): Primary | ICD-10-CM

## 2021-04-21 DIAGNOSIS — G89.29 CHRONIC PAIN OF BOTH KNEES: ICD-10-CM

## 2021-04-21 DIAGNOSIS — L98.9 SKIN ABNORMALITY: ICD-10-CM

## 2021-04-21 DIAGNOSIS — E78.5 HYPERLIPIDEMIA ASSOCIATED WITH TYPE 2 DIABETES MELLITUS (HCC): ICD-10-CM

## 2021-04-21 DIAGNOSIS — E11.69 DIABETES MELLITUS TYPE 2 IN OBESE (HCC): ICD-10-CM

## 2021-04-21 DIAGNOSIS — H35.30 MACULAR DEGENERATION OF BOTH EYES, UNSPECIFIED TYPE: ICD-10-CM

## 2021-04-21 DIAGNOSIS — E66.9 DIABETES MELLITUS TYPE 2 IN OBESE (HCC): ICD-10-CM

## 2021-04-21 DIAGNOSIS — H35.373 MACULAR PUCKER OF BOTH EYES: ICD-10-CM

## 2021-04-21 DIAGNOSIS — K51.919 ULCERATIVE COLITIS WITH COMPLICATION, UNSPECIFIED LOCATION (HCC): ICD-10-CM

## 2021-04-21 PROCEDURE — 80048 BASIC METABOLIC PNL TOTAL CA: CPT

## 2021-04-21 PROCEDURE — 84460 ALANINE AMINO (ALT) (SGPT): CPT

## 2021-04-21 PROCEDURE — 99214 OFFICE O/P EST MOD 30 MIN: CPT | Performed by: INTERNAL MEDICINE

## 2021-04-21 PROCEDURE — 90732 PPSV23 VACC 2 YRS+ SUBQ/IM: CPT | Performed by: INTERNAL MEDICINE

## 2021-04-21 PROCEDURE — G0009 ADMIN PNEUMOCOCCAL VACCINE: HCPCS | Performed by: INTERNAL MEDICINE

## 2021-04-21 PROCEDURE — 3079F DIAST BP 80-89 MM HG: CPT | Performed by: INTERNAL MEDICINE

## 2021-04-21 PROCEDURE — 86803 HEPATITIS C AB TEST: CPT

## 2021-04-21 PROCEDURE — 3074F SYST BP LT 130 MM HG: CPT | Performed by: INTERNAL MEDICINE

## 2021-04-21 PROCEDURE — 3008F BODY MASS INDEX DOCD: CPT | Performed by: INTERNAL MEDICINE

## 2021-04-21 PROCEDURE — 83036 HEMOGLOBIN GLYCOSYLATED A1C: CPT

## 2021-04-21 PROCEDURE — 36415 COLL VENOUS BLD VENIPUNCTURE: CPT

## 2021-04-21 PROCEDURE — 82570 ASSAY OF URINE CREATININE: CPT

## 2021-04-21 PROCEDURE — 82043 UR ALBUMIN QUANTITATIVE: CPT

## 2021-04-21 PROCEDURE — 80061 LIPID PANEL: CPT

## 2021-04-21 PROCEDURE — 84450 TRANSFERASE (AST) (SGOT): CPT

## 2021-04-21 PROCEDURE — 83036 HEMOGLOBIN GLYCOSYLATED A1C: CPT | Performed by: INTERNAL MEDICINE

## 2021-04-21 NOTE — PATIENT INSTRUCTIONS
You are due for your labs and knee x-rays TODAY. You are due for your mammogram and bone scan (DEXA) as soon as possible. You can complete them together. I also advise you get the shingrix vaccine once in a lifetime.  This is NEW and considered kira

## 2021-04-21 NOTE — PROGRESS NOTES
Eliana Mckenzie is a 76year old female. HPI:   Patient presents for the following issues. Dr. Arpita Peralta has also requested pre-op evaluation for bilateral cataract surgery. Will have left eye on 5/19 and right eye on 6/16.   1. DM: does not chec Relation Age of Onset   • Heart Disorder Father         MI   • Diabetes Father       Past Medical History:   Diagnosis Date   • Pre-diabetes       Past Surgical History:   Procedure Laterality Date   • COLONOSCOPY  07/17/2018   • OOPHORECTOMY Right 1994 See management below. -  However, at this time she has good quality of life with tylenol and daily walking.  - explained if her QOL or mobility worsens, we can consider a neuropathic agent.  She declines for now.   - discussed importance of daily stretchi 7/24/2020  Stress Management: feels she is coping well  Indication for ASA (50-57 yo): avoid ASA for colitis as above. Colon Cancer Screening: see above  Cervical Cancer Screening: neg cytology 2/2019. No longer needs pap smears.    Breast Cancer Screenin

## 2021-04-22 DIAGNOSIS — E78.5 HYPERLIPIDEMIA ASSOCIATED WITH TYPE 2 DIABETES MELLITUS (HCC): ICD-10-CM

## 2021-04-22 DIAGNOSIS — E11.69 HYPERLIPIDEMIA ASSOCIATED WITH TYPE 2 DIABETES MELLITUS (HCC): ICD-10-CM

## 2021-04-22 RX ORDER — ATORVASTATIN CALCIUM 20 MG/1
20 TABLET, FILM COATED ORAL DAILY
Qty: 90 TABLET | Refills: 1 | Status: SHIPPED | OUTPATIENT
Start: 2021-04-22 | End: 2021-10-20

## 2021-04-26 DIAGNOSIS — E78.5 HYPERLIPIDEMIA ASSOCIATED WITH TYPE 2 DIABETES MELLITUS (HCC): Primary | ICD-10-CM

## 2021-04-26 DIAGNOSIS — E11.69 DIABETES MELLITUS TYPE 2 IN OBESE (HCC): ICD-10-CM

## 2021-04-26 DIAGNOSIS — E66.9 DIABETES MELLITUS TYPE 2 IN OBESE (HCC): ICD-10-CM

## 2021-04-26 DIAGNOSIS — E11.69 HYPERLIPIDEMIA ASSOCIATED WITH TYPE 2 DIABETES MELLITUS (HCC): Primary | ICD-10-CM

## 2021-04-26 DIAGNOSIS — R80.9 TYPE 2 DIABETES MELLITUS WITH ALBUMINURIA (HCC): ICD-10-CM

## 2021-04-26 DIAGNOSIS — E11.29 TYPE 2 DIABETES MELLITUS WITH ALBUMINURIA (HCC): ICD-10-CM

## 2021-05-16 ENCOUNTER — LAB ENCOUNTER (OUTPATIENT)
Dept: LAB | Facility: HOSPITAL | Age: 66
End: 2021-05-16
Attending: OPHTHALMOLOGY
Payer: MEDICARE

## 2021-05-16 DIAGNOSIS — Z01.818 PRE-PROCEDURAL EXAMINATION: ICD-10-CM

## 2021-06-21 PROBLEM — K51.919 ULCERATIVE COLITIS WITH COMPLICATION (HCC): Status: RESOLVED | Noted: 2020-07-24 | Resolved: 2021-06-21

## 2021-06-21 PROBLEM — R19.4 CHANGE IN BOWEL HABITS: Status: ACTIVE | Noted: 2021-06-21

## 2021-07-02 ENCOUNTER — TELEPHONE (OUTPATIENT)
Dept: INTERNAL MEDICINE CLINIC | Facility: CLINIC | Age: 66
End: 2021-07-02

## 2021-10-19 DIAGNOSIS — E11.69 HYPERLIPIDEMIA ASSOCIATED WITH TYPE 2 DIABETES MELLITUS: ICD-10-CM

## 2021-10-19 DIAGNOSIS — E78.5 HYPERLIPIDEMIA ASSOCIATED WITH TYPE 2 DIABETES MELLITUS: ICD-10-CM

## 2021-10-20 RX ORDER — ATORVASTATIN CALCIUM 20 MG/1
TABLET, FILM COATED ORAL
Qty: 30 TABLET | Refills: 0 | Status: SHIPPED | OUTPATIENT
Start: 2021-10-20 | End: 2021-11-22

## 2021-11-20 DIAGNOSIS — E78.5 HYPERLIPIDEMIA ASSOCIATED WITH TYPE 2 DIABETES MELLITUS (HCC): ICD-10-CM

## 2021-11-20 DIAGNOSIS — E11.69 HYPERLIPIDEMIA ASSOCIATED WITH TYPE 2 DIABETES MELLITUS (HCC): ICD-10-CM

## 2021-11-22 RX ORDER — ATORVASTATIN CALCIUM 20 MG/1
TABLET, FILM COATED ORAL
Qty: 90 TABLET | Refills: 0 | Status: SHIPPED | OUTPATIENT
Start: 2021-11-22

## 2021-11-22 NOTE — TELEPHONE ENCOUNTER
Passed protocol     Last refill:  ATORVASTATIN 20 MG Oral Tab 30 tablet 0 10/20/2021    Sig: Manuel Kill ONE TABLET BY MOUTH DAILY       LOV:   4/21/2021 Dr Abdias Ortiz RTC 3-6 months  # HLP assoc with DM: started atorvastatin in 7/2020 and LDL is not at goal so incre

## 2022-02-22 DIAGNOSIS — E11.69 HYPERLIPIDEMIA ASSOCIATED WITH TYPE 2 DIABETES MELLITUS: ICD-10-CM

## 2022-02-22 DIAGNOSIS — E78.5 HYPERLIPIDEMIA ASSOCIATED WITH TYPE 2 DIABETES MELLITUS: ICD-10-CM

## 2022-02-23 RX ORDER — ATORVASTATIN CALCIUM 20 MG/1
TABLET, FILM COATED ORAL
Qty: 90 TABLET | Refills: 0 | OUTPATIENT
Start: 2022-02-23

## 2022-02-24 RX ORDER — ATORVASTATIN CALCIUM 20 MG/1
20 TABLET, FILM COATED ORAL DAILY
Qty: 30 TABLET | Refills: 0 | Status: SHIPPED | OUTPATIENT
Start: 2022-02-24 | End: 2022-03-31

## 2022-02-24 NOTE — TELEPHONE ENCOUNTER
Pt scheduled an appointment for April. Pt stated she will complete her labs tomorrow. Requesting medication until appointment because she is completely out.     Please advise    Future Appointments   Date Time Provider Cherie Berkowitz   4/22/2022 10:00 AM Sami Ramírez MD [facility] [place]

## 2022-03-01 ENCOUNTER — LAB ENCOUNTER (OUTPATIENT)
Dept: LAB | Age: 67
End: 2022-03-01
Attending: INTERNAL MEDICINE
Payer: MEDICARE

## 2022-03-01 DIAGNOSIS — E11.69 HYPERLIPIDEMIA ASSOCIATED WITH TYPE 2 DIABETES MELLITUS (HCC): ICD-10-CM

## 2022-03-01 DIAGNOSIS — E11.29 TYPE 2 DIABETES MELLITUS WITH ALBUMINURIA (HCC): ICD-10-CM

## 2022-03-01 DIAGNOSIS — E66.9 DIABETES MELLITUS TYPE 2 IN OBESE (HCC): ICD-10-CM

## 2022-03-01 DIAGNOSIS — E78.5 HYPERLIPIDEMIA ASSOCIATED WITH TYPE 2 DIABETES MELLITUS (HCC): ICD-10-CM

## 2022-03-01 DIAGNOSIS — R80.9 TYPE 2 DIABETES MELLITUS WITH ALBUMINURIA (HCC): ICD-10-CM

## 2022-03-01 DIAGNOSIS — E11.69 DIABETES MELLITUS TYPE 2 IN OBESE (HCC): ICD-10-CM

## 2022-03-01 LAB
ALT SERPL-CCNC: 26 U/L
ANION GAP SERPL CALC-SCNC: 6 MMOL/L (ref 0–18)
AST SERPL-CCNC: 16 U/L (ref 15–37)
BUN BLD-MCNC: 15 MG/DL (ref 7–18)
CALCIUM BLD-MCNC: 9.5 MG/DL (ref 8.5–10.1)
CHLORIDE SERPL-SCNC: 107 MMOL/L (ref 98–112)
CHOLEST SERPL-MCNC: 131 MG/DL (ref ?–200)
CO2 SERPL-SCNC: 27 MMOL/L (ref 21–32)
CREAT BLD-MCNC: 0.72 MG/DL
CREAT UR-SCNC: 55.2 MG/DL
EST. AVERAGE GLUCOSE BLD GHB EST-MCNC: 151 MG/DL (ref 68–126)
FASTING PATIENT LIPID ANSWER: YES
FASTING STATUS PATIENT QL REPORTED: YES
GLUCOSE BLD-MCNC: 148 MG/DL (ref 70–99)
HBA1C MFR BLD: 6.9 % (ref ?–5.7)
HDLC SERPL-MCNC: 43 MG/DL (ref 40–59)
LDLC SERPL CALC-MCNC: 66 MG/DL (ref ?–100)
MICROALBUMIN UR-MCNC: 2.96 MG/DL
MICROALBUMIN/CREAT 24H UR-RTO: 53.6 UG/MG (ref ?–30)
NONHDLC SERPL-MCNC: 88 MG/DL (ref ?–130)
OSMOLALITY SERPL CALC.SUM OF ELEC: 294 MOSM/KG (ref 275–295)
POTASSIUM SERPL-SCNC: 4.5 MMOL/L (ref 3.5–5.1)
SODIUM SERPL-SCNC: 140 MMOL/L (ref 136–145)
TRIGL SERPL-MCNC: 123 MG/DL (ref 30–149)
VLDLC SERPL CALC-MCNC: 18 MG/DL (ref 0–30)

## 2022-03-01 PROCEDURE — 80061 LIPID PANEL: CPT

## 2022-03-01 PROCEDURE — 36415 COLL VENOUS BLD VENIPUNCTURE: CPT

## 2022-03-01 PROCEDURE — 82570 ASSAY OF URINE CREATININE: CPT

## 2022-03-01 PROCEDURE — 83036 HEMOGLOBIN GLYCOSYLATED A1C: CPT

## 2022-03-01 PROCEDURE — 84450 TRANSFERASE (AST) (SGOT): CPT

## 2022-03-01 PROCEDURE — 84460 ALANINE AMINO (ALT) (SGPT): CPT

## 2022-03-01 PROCEDURE — 80048 BASIC METABOLIC PNL TOTAL CA: CPT

## 2022-03-01 PROCEDURE — 82043 UR ALBUMIN QUANTITATIVE: CPT

## 2022-03-31 RX ORDER — ATORVASTATIN CALCIUM 20 MG/1
TABLET, FILM COATED ORAL
Qty: 30 TABLET | Refills: 0 | Status: SHIPPED | OUTPATIENT
Start: 2022-03-31

## 2022-04-22 ENCOUNTER — OFFICE VISIT (OUTPATIENT)
Dept: INTERNAL MEDICINE CLINIC | Facility: CLINIC | Age: 67
End: 2022-04-22
Payer: MEDICARE

## 2022-04-22 VITALS
RESPIRATION RATE: 16 BRPM | HEART RATE: 71 BPM | DIASTOLIC BLOOD PRESSURE: 70 MMHG | SYSTOLIC BLOOD PRESSURE: 120 MMHG | WEIGHT: 171.38 LBS | OXYGEN SATURATION: 98 % | TEMPERATURE: 99 F | HEIGHT: 61.5 IN | BODY MASS INDEX: 31.94 KG/M2

## 2022-04-22 DIAGNOSIS — Z12.31 VISIT FOR SCREENING MAMMOGRAM: ICD-10-CM

## 2022-04-22 DIAGNOSIS — Z13.31 SCREENING FOR DEPRESSION: ICD-10-CM

## 2022-04-22 DIAGNOSIS — G89.29 CHRONIC BILATERAL LOW BACK PAIN WITHOUT SCIATICA: ICD-10-CM

## 2022-04-22 DIAGNOSIS — E11.69 HYPERLIPIDEMIA ASSOCIATED WITH TYPE 2 DIABETES MELLITUS (HCC): ICD-10-CM

## 2022-04-22 DIAGNOSIS — E66.9 DIABETES MELLITUS TYPE 2 IN OBESE (HCC): ICD-10-CM

## 2022-04-22 DIAGNOSIS — E11.29 TYPE 2 DIABETES MELLITUS WITH ALBUMINURIA (HCC): ICD-10-CM

## 2022-04-22 DIAGNOSIS — M54.50 CHRONIC BILATERAL LOW BACK PAIN WITHOUT SCIATICA: ICD-10-CM

## 2022-04-22 DIAGNOSIS — E11.69 DIABETES MELLITUS TYPE 2 IN OBESE (HCC): ICD-10-CM

## 2022-04-22 DIAGNOSIS — Z00.00 ROUTINE GENERAL MEDICAL EXAMINATION AT A HEALTH CARE FACILITY: Primary | ICD-10-CM

## 2022-04-22 DIAGNOSIS — Z78.0 POSTMENOPAUSAL: ICD-10-CM

## 2022-04-22 DIAGNOSIS — E78.5 HYPERLIPIDEMIA ASSOCIATED WITH TYPE 2 DIABETES MELLITUS (HCC): ICD-10-CM

## 2022-04-22 DIAGNOSIS — R80.9 TYPE 2 DIABETES MELLITUS WITH ALBUMINURIA (HCC): ICD-10-CM

## 2022-04-22 PROCEDURE — 99214 OFFICE O/P EST MOD 30 MIN: CPT | Performed by: INTERNAL MEDICINE

## 2022-04-22 PROCEDURE — G0439 PPPS, SUBSEQ VISIT: HCPCS | Performed by: INTERNAL MEDICINE

## 2022-04-22 RX ORDER — LISINOPRIL 2.5 MG/1
2.5 TABLET ORAL EVERY EVENING
Qty: 90 TABLET | Refills: 0 | Status: SHIPPED | OUTPATIENT
Start: 2022-04-22

## 2022-04-22 RX ORDER — ACETAMINOPHEN 500 MG
500 TABLET ORAL EVERY 6 HOURS PRN
COMMUNITY

## 2022-04-27 DIAGNOSIS — E78.5 HYPERLIPIDEMIA ASSOCIATED WITH TYPE 2 DIABETES MELLITUS: ICD-10-CM

## 2022-04-27 DIAGNOSIS — E11.69 HYPERLIPIDEMIA ASSOCIATED WITH TYPE 2 DIABETES MELLITUS: ICD-10-CM

## 2022-04-27 RX ORDER — ATORVASTATIN CALCIUM 20 MG/1
20 TABLET, FILM COATED ORAL DAILY
Qty: 90 TABLET | Refills: 0 | Status: SHIPPED | OUTPATIENT
Start: 2022-04-27 | End: 2022-06-01

## 2022-04-27 NOTE — TELEPHONE ENCOUNTER
Bib Chapman with [facility] requesting new prescription to be sent. Per Bib Chapman needs to be sent from Dr Meet Linder.      ATORVASTATIN 20 MG Oral [facility] [id number] - [address] [phone], [phone]    Last OV 4/22/22

## 2022-05-18 ENCOUNTER — HOSPITAL ENCOUNTER (OUTPATIENT)
Dept: BONE DENSITY | Age: 67
Discharge: HOME | End: 2022-05-18
Attending: INTERNAL MEDICINE
Payer: MEDICARE

## 2022-05-18 ENCOUNTER — LABORATORY ENCOUNTER (OUTPATIENT)
Dept: LAB | Age: 67
End: 2022-05-18
Attending: INTERNAL MEDICINE
Payer: MEDICARE

## 2022-05-18 ENCOUNTER — HOSPITAL ENCOUNTER (OUTPATIENT)
Dept: MAMMOGRAPHY | Age: 67
Discharge: HOME | End: 2022-05-18
Attending: INTERNAL MEDICINE
Payer: MEDICARE

## 2022-05-18 DIAGNOSIS — Z00.00 ROUTINE GENERAL MEDICAL EXAMINATION AT A HEALTH CARE FACILITY: ICD-10-CM

## 2022-05-18 DIAGNOSIS — Z78.0 POSTMENOPAUSAL: ICD-10-CM

## 2022-05-18 DIAGNOSIS — Z12.31 VISIT FOR SCREENING MAMMOGRAM: ICD-10-CM

## 2022-05-18 DIAGNOSIS — K92.1 HEMATOCHEZIA: ICD-10-CM

## 2022-05-18 PROBLEM — M85.89 OSTEOPENIA OF MULTIPLE SITES: Status: ACTIVE | Noted: 2022-05-18

## 2022-05-18 LAB
ANION GAP SERPL CALC-SCNC: 6 MMOL/L (ref 0–18)
BUN BLD-MCNC: 13 MG/DL (ref 7–18)
CALCIUM BLD-MCNC: 9.8 MG/DL (ref 8.5–10.1)
CHLORIDE SERPL-SCNC: 105 MMOL/L (ref 98–112)
CO2 SERPL-SCNC: 27 MMOL/L (ref 21–32)
CREAT BLD-MCNC: 0.65 MG/DL
FASTING STATUS PATIENT QL REPORTED: YES
GLUCOSE BLD-MCNC: 156 MG/DL (ref 70–99)
OSMOLALITY SERPL CALC.SUM OF ELEC: 289 MOSM/KG (ref 275–295)
POTASSIUM SERPL-SCNC: 4.3 MMOL/L (ref 3.5–5.1)
SODIUM SERPL-SCNC: 138 MMOL/L (ref 136–145)
T4 FREE SERPL-MCNC: 1 NG/DL (ref 0.8–1.7)
TSI SER-ACNC: 0.97 MIU/ML (ref 0.36–3.74)

## 2022-05-18 PROCEDURE — 84443 ASSAY THYROID STIM HORMONE: CPT

## 2022-05-18 PROCEDURE — 36415 COLL VENOUS BLD VENIPUNCTURE: CPT

## 2022-05-18 PROCEDURE — 80048 BASIC METABOLIC PNL TOTAL CA: CPT

## 2022-05-18 PROCEDURE — 77063 BREAST TOMOSYNTHESIS BI: CPT | Performed by: INTERNAL MEDICINE

## 2022-05-18 PROCEDURE — 83993 ASSAY FOR CALPROTECTIN FECAL: CPT

## 2022-05-18 PROCEDURE — 84439 ASSAY OF FREE THYROXINE: CPT

## 2022-05-18 PROCEDURE — 77080 DXA BONE DENSITY AXIAL: CPT | Performed by: INTERNAL MEDICINE

## 2022-05-18 PROCEDURE — 77067 SCR MAMMO BI INCL CAD: CPT | Performed by: INTERNAL MEDICINE

## 2022-05-21 LAB — CALPROTECTIN, FECAL: 91 UG/G

## 2022-06-01 ENCOUNTER — OFFICE VISIT (OUTPATIENT)
Dept: INTERNAL MEDICINE CLINIC | Facility: CLINIC | Age: 67
End: 2022-06-01
Payer: MEDICARE

## 2022-06-01 VITALS
DIASTOLIC BLOOD PRESSURE: 78 MMHG | TEMPERATURE: 98 F | RESPIRATION RATE: 16 BRPM | BODY MASS INDEX: 31.69 KG/M2 | HEIGHT: 61.5 IN | OXYGEN SATURATION: 98 % | HEART RATE: 83 BPM | SYSTOLIC BLOOD PRESSURE: 119 MMHG | WEIGHT: 170 LBS

## 2022-06-01 DIAGNOSIS — L91.8 SKIN TAG: ICD-10-CM

## 2022-06-01 DIAGNOSIS — E11.69 HYPERLIPIDEMIA ASSOCIATED WITH TYPE 2 DIABETES MELLITUS (HCC): ICD-10-CM

## 2022-06-01 DIAGNOSIS — H35.30 MACULAR DEGENERATION OF BOTH EYES, UNSPECIFIED TYPE: Primary | ICD-10-CM

## 2022-06-01 DIAGNOSIS — E78.5 HYPERLIPIDEMIA ASSOCIATED WITH TYPE 2 DIABETES MELLITUS (HCC): ICD-10-CM

## 2022-06-01 DIAGNOSIS — E11.69 DIABETES MELLITUS TYPE 2 IN OBESE (HCC): ICD-10-CM

## 2022-06-01 DIAGNOSIS — H35.373 MACULAR PUCKER OF BOTH EYES: ICD-10-CM

## 2022-06-01 DIAGNOSIS — E11.29 TYPE 2 DIABETES MELLITUS WITH ALBUMINURIA (HCC): ICD-10-CM

## 2022-06-01 DIAGNOSIS — R19.4 CHANGE IN BOWEL HABITS: ICD-10-CM

## 2022-06-01 DIAGNOSIS — E66.9 DIABETES MELLITUS TYPE 2 IN OBESE (HCC): ICD-10-CM

## 2022-06-01 DIAGNOSIS — M85.89 OSTEOPENIA OF MULTIPLE SITES: ICD-10-CM

## 2022-06-01 DIAGNOSIS — R80.9 TYPE 2 DIABETES MELLITUS WITH ALBUMINURIA (HCC): ICD-10-CM

## 2022-06-01 PROCEDURE — 99214 OFFICE O/P EST MOD 30 MIN: CPT | Performed by: INTERNAL MEDICINE

## 2022-06-01 PROCEDURE — 88305 TISSUE EXAM BY PATHOLOGIST: CPT | Performed by: INTERNAL MEDICINE

## 2022-06-01 PROCEDURE — 11200 RMVL SKIN TAGS UP TO&INC 15: CPT | Performed by: INTERNAL MEDICINE

## 2022-06-01 PROCEDURE — 88304 TISSUE EXAM BY PATHOLOGIST: CPT | Performed by: INTERNAL MEDICINE

## 2022-06-01 RX ORDER — LISINOPRIL 2.5 MG/1
2.5 TABLET ORAL EVERY EVENING
Qty: 90 TABLET | Refills: 1 | Status: SHIPPED | OUTPATIENT
Start: 2022-06-01

## 2022-06-01 RX ORDER — ATORVASTATIN CALCIUM 20 MG/1
20 TABLET, FILM COATED ORAL DAILY
Qty: 90 TABLET | Refills: 3 | Status: SHIPPED | OUTPATIENT
Start: 2022-06-01

## 2023-03-16 ENCOUNTER — TELEPHONE (OUTPATIENT)
Dept: INTERNAL MEDICINE CLINIC | Facility: CLINIC | Age: 68
End: 2023-03-16

## 2023-03-17 DIAGNOSIS — M85.89 OSTEOPENIA OF MULTIPLE SITES: Primary | ICD-10-CM

## 2023-03-17 DIAGNOSIS — E11.29 TYPE 2 DIABETES MELLITUS WITH ALBUMINURIA (HCC): ICD-10-CM

## 2023-03-17 DIAGNOSIS — E11.69 HYPERLIPIDEMIA ASSOCIATED WITH TYPE 2 DIABETES MELLITUS (HCC): ICD-10-CM

## 2023-03-17 DIAGNOSIS — E78.5 HYPERLIPIDEMIA ASSOCIATED WITH TYPE 2 DIABETES MELLITUS (HCC): ICD-10-CM

## 2023-03-17 DIAGNOSIS — R80.9 TYPE 2 DIABETES MELLITUS WITH ALBUMINURIA (HCC): ICD-10-CM

## 2023-03-17 RX ORDER — LISINOPRIL 2.5 MG/1
TABLET ORAL
Qty: 90 TABLET | Refills: 0 | OUTPATIENT
Start: 2023-03-17

## 2023-03-17 NOTE — TELEPHONE ENCOUNTER
Patient informed. She will have labs drawn on Monday. Appt scheduled.     Future Appointments   Date Time Provider Cherie Berkowitz   5/15/2023  9:00 AM Reny Cartagena MD [facility] [place]

## 2023-03-20 ENCOUNTER — LAB ENCOUNTER (OUTPATIENT)
Dept: LAB | Age: 68
End: 2023-03-20
Attending: INTERNAL MEDICINE
Payer: MEDICARE

## 2023-03-20 DIAGNOSIS — E11.29 TYPE 2 DIABETES MELLITUS WITH ALBUMINURIA (HCC): ICD-10-CM

## 2023-03-20 DIAGNOSIS — E78.5 HYPERLIPIDEMIA ASSOCIATED WITH TYPE 2 DIABETES MELLITUS (HCC): ICD-10-CM

## 2023-03-20 DIAGNOSIS — E11.69 HYPERLIPIDEMIA ASSOCIATED WITH TYPE 2 DIABETES MELLITUS (HCC): ICD-10-CM

## 2023-03-20 DIAGNOSIS — R80.9 TYPE 2 DIABETES MELLITUS WITH ALBUMINURIA (HCC): ICD-10-CM

## 2023-03-20 DIAGNOSIS — M85.89 OSTEOPENIA OF MULTIPLE SITES: ICD-10-CM

## 2023-03-20 LAB
ALT SERPL-CCNC: 31 U/L
ANION GAP SERPL CALC-SCNC: 4 MMOL/L (ref 0–18)
AST SERPL-CCNC: 19 U/L (ref 15–37)
BUN BLD-MCNC: 17 MG/DL (ref 7–18)
CALCIUM BLD-MCNC: 9.8 MG/DL (ref 8.5–10.1)
CHLORIDE SERPL-SCNC: 104 MMOL/L (ref 98–112)
CHOLEST SERPL-MCNC: 134 MG/DL (ref ?–200)
CO2 SERPL-SCNC: 29 MMOL/L (ref 21–32)
CREAT BLD-MCNC: 0.74 MG/DL
CREAT UR-SCNC: 98.6 MG/DL
EST. AVERAGE GLUCOSE BLD GHB EST-MCNC: 163 MG/DL (ref 68–126)
FASTING PATIENT LIPID ANSWER: YES
FASTING STATUS PATIENT QL REPORTED: YES
GFR SERPLBLD BASED ON 1.73 SQ M-ARVRAT: 88 ML/MIN/1.73M2 (ref 60–?)
GLUCOSE BLD-MCNC: 148 MG/DL (ref 70–99)
HBA1C MFR BLD: 7.3 % (ref ?–5.7)
HDLC SERPL-MCNC: 44 MG/DL (ref 40–59)
LDLC SERPL CALC-MCNC: 64 MG/DL (ref ?–100)
MICROALBUMIN UR-MCNC: 4.94 MG/DL
MICROALBUMIN/CREAT 24H UR-RTO: 50.1 UG/MG (ref ?–30)
NONHDLC SERPL-MCNC: 90 MG/DL (ref ?–130)
OSMOLALITY SERPL CALC.SUM OF ELEC: 288 MOSM/KG (ref 275–295)
POTASSIUM SERPL-SCNC: 4.7 MMOL/L (ref 3.5–5.1)
SODIUM SERPL-SCNC: 137 MMOL/L (ref 136–145)
TRIGL SERPL-MCNC: 154 MG/DL (ref 30–149)
VIT D+METAB SERPL-MCNC: 30 NG/ML (ref 30–100)
VLDLC SERPL CALC-MCNC: 23 MG/DL (ref 0–30)

## 2023-03-20 PROCEDURE — 82043 UR ALBUMIN QUANTITATIVE: CPT

## 2023-03-20 PROCEDURE — 80061 LIPID PANEL: CPT

## 2023-03-20 PROCEDURE — 82306 VITAMIN D 25 HYDROXY: CPT

## 2023-03-20 PROCEDURE — 83036 HEMOGLOBIN GLYCOSYLATED A1C: CPT

## 2023-03-20 PROCEDURE — 80048 BASIC METABOLIC PNL TOTAL CA: CPT

## 2023-03-20 PROCEDURE — 84460 ALANINE AMINO (ALT) (SGPT): CPT

## 2023-03-20 PROCEDURE — 84450 TRANSFERASE (AST) (SGOT): CPT

## 2023-03-20 PROCEDURE — 36415 COLL VENOUS BLD VENIPUNCTURE: CPT

## 2023-03-20 PROCEDURE — 82570 ASSAY OF URINE CREATININE: CPT

## 2023-05-15 ENCOUNTER — OFFICE VISIT (OUTPATIENT)
Dept: INTERNAL MEDICINE CLINIC | Facility: CLINIC | Age: 68
End: 2023-05-15
Payer: MEDICARE

## 2023-05-15 VITALS
SYSTOLIC BLOOD PRESSURE: 110 MMHG | HEIGHT: 61 IN | WEIGHT: 176.63 LBS | OXYGEN SATURATION: 97 % | HEART RATE: 76 BPM | BODY MASS INDEX: 33.35 KG/M2 | RESPIRATION RATE: 16 BRPM | DIASTOLIC BLOOD PRESSURE: 78 MMHG | TEMPERATURE: 98 F

## 2023-05-15 DIAGNOSIS — Z87.19 HISTORY OF ULCERATIVE COLITIS: ICD-10-CM

## 2023-05-15 DIAGNOSIS — Z00.00 ROUTINE GENERAL MEDICAL EXAMINATION AT A HEALTH CARE FACILITY: Primary | ICD-10-CM

## 2023-05-15 DIAGNOSIS — E66.9 DIABETES MELLITUS TYPE 2 IN OBESE (HCC): ICD-10-CM

## 2023-05-15 DIAGNOSIS — H35.373 MACULAR PUCKER OF BOTH EYES: ICD-10-CM

## 2023-05-15 DIAGNOSIS — H35.30 MACULAR DEGENERATION OF BOTH EYES, UNSPECIFIED TYPE: ICD-10-CM

## 2023-05-15 DIAGNOSIS — E11.29 TYPE 2 DIABETES MELLITUS WITH ALBUMINURIA (HCC): ICD-10-CM

## 2023-05-15 DIAGNOSIS — E11.69 HYPERLIPIDEMIA ASSOCIATED WITH TYPE 2 DIABETES MELLITUS (HCC): ICD-10-CM

## 2023-05-15 DIAGNOSIS — L65.9 HAIR LOSS: ICD-10-CM

## 2023-05-15 DIAGNOSIS — E78.5 HYPERLIPIDEMIA ASSOCIATED WITH TYPE 2 DIABETES MELLITUS (HCC): ICD-10-CM

## 2023-05-15 DIAGNOSIS — M85.89 OSTEOPENIA OF MULTIPLE SITES: ICD-10-CM

## 2023-05-15 DIAGNOSIS — R80.9 TYPE 2 DIABETES MELLITUS WITH ALBUMINURIA (HCC): ICD-10-CM

## 2023-05-15 DIAGNOSIS — Z12.31 ENCOUNTER FOR SCREENING MAMMOGRAM FOR BREAST CANCER: ICD-10-CM

## 2023-05-15 DIAGNOSIS — E11.69 DIABETES MELLITUS TYPE 2 IN OBESE (HCC): ICD-10-CM

## 2023-05-15 PROCEDURE — 99214 OFFICE O/P EST MOD 30 MIN: CPT | Performed by: INTERNAL MEDICINE

## 2023-05-15 PROCEDURE — G0439 PPPS, SUBSEQ VISIT: HCPCS | Performed by: INTERNAL MEDICINE

## 2023-05-15 RX ORDER — CHOLECALCIFEROL (VITAMIN D3) 125 MCG
2000 CAPSULE ORAL DAILY
Qty: 90 TABLET | Refills: 3 | Status: SHIPPED | OUTPATIENT
Start: 2023-05-15

## 2023-05-15 RX ORDER — CHOLECALCIFEROL (VITAMIN D3) 125 MCG
2000 CAPSULE ORAL DAILY
COMMUNITY
End: 2023-05-15

## 2023-05-15 RX ORDER — ATORVASTATIN CALCIUM 20 MG/1
20 TABLET, FILM COATED ORAL DAILY
Qty: 90 TABLET | Refills: 3 | Status: SHIPPED | OUTPATIENT
Start: 2023-05-15

## 2023-05-15 RX ORDER — LISINOPRIL 2.5 MG/1
2.5 TABLET ORAL EVERY EVENING
Qty: 90 TABLET | Refills: 3 | Status: SHIPPED | OUTPATIENT
Start: 2023-05-15

## 2023-05-15 RX ORDER — FAMOTIDINE 20 MG/1
20 TABLET, FILM COATED ORAL NIGHTLY
Qty: 30 TABLET | Refills: 1 | Status: SHIPPED | OUTPATIENT
Start: 2023-05-15

## 2023-05-15 NOTE — PATIENT INSTRUCTIONS
Please take vitamin D3 2000 units every day. You are due for your mammogram after May 18th, 2023. For your cough please try over the counter famotidine 20 mg 1 hour prior to bedtime. If this does not help your cough in 4 weeks. Then try flonase nasal spray once daily for 2-4 weeks. If neither medication helps, you can see ENT. Please complete your labs in September, 2023.

## 2023-05-16 RX ORDER — FAMOTIDINE 20 MG/1
TABLET, FILM COATED ORAL
Qty: 90 TABLET | Refills: 0 | Status: SHIPPED | OUTPATIENT
Start: 2023-05-16

## 2023-05-16 NOTE — TELEPHONE ENCOUNTER
Famotidine 20 mg  Filled 5-15-23  Qty 30  1 refill  Upcoming appt. 5-6-24 KS  LOV 5/15/2023 KS  RTC 1 yr

## 2023-08-18 RX ORDER — FAMOTIDINE 20 MG/1
20 TABLET, FILM COATED ORAL NIGHTLY
Qty: 90 TABLET | Refills: 3 | Status: SHIPPED | OUTPATIENT
Start: 2023-08-18

## 2023-08-18 NOTE — TELEPHONE ENCOUNTER
FAMOTIDINE 20MG TABLETS          Will file in chart as: FAMOTIDINE 20 MG Oral Tab    Sig: TAKE 1 TABLET(20 MG) BY MOUTH AT BEDTIME    Disp: 90 tablet    Refills: 0 (Pharmacy requested: Not specified)    Start: 8/17/2023    Class: Normal    Non-formulary    Last ordered: 3 months ago by Bao Pratt MD Last refill: 5/16/2023    Rx #: [id number]    Gastrointestinal Medication Protocol Peanjm1708/17/2023 09:16 PM    Appointment in past 6 or next 1 month      LOV 5/15/2023  RTC 1 year   Filled 5/16/2023 90 tabs 0 refills   Future Appointments   Date Time Provider [facility]   5/6/2024  8:30 AM Natalie Hinton MD [facility] [place]

## 2024-05-06 ENCOUNTER — LAB ENCOUNTER (OUTPATIENT)
Dept: LAB | Age: 69
End: 2024-05-06
Attending: INTERNAL MEDICINE
Payer: MEDICARE

## 2024-05-06 ENCOUNTER — OFFICE VISIT (OUTPATIENT)
Dept: INTERNAL MEDICINE CLINIC | Facility: CLINIC | Age: 69
End: 2024-05-06
Payer: MEDICARE

## 2024-05-06 ENCOUNTER — NURSE ONLY (OUTPATIENT)
Dept: INTERNAL MEDICINE CLINIC | Facility: CLINIC | Age: 69
End: 2024-05-06
Payer: MEDICARE

## 2024-05-06 VITALS
RESPIRATION RATE: 16 BRPM | DIASTOLIC BLOOD PRESSURE: 80 MMHG | WEIGHT: 180 LBS | HEIGHT: 61 IN | SYSTOLIC BLOOD PRESSURE: 132 MMHG | HEART RATE: 71 BPM | BODY MASS INDEX: 33.99 KG/M2 | OXYGEN SATURATION: 95 % | TEMPERATURE: 98 F

## 2024-05-06 DIAGNOSIS — R80.9 TYPE 2 DIABETES MELLITUS WITH ALBUMINURIA (HCC): ICD-10-CM

## 2024-05-06 DIAGNOSIS — H35.30 MACULAR DEGENERATION OF BOTH EYES, UNSPECIFIED TYPE: ICD-10-CM

## 2024-05-06 DIAGNOSIS — E66.9 TYPE 2 DIABETES MELLITUS WITH OBESITY (HCC): ICD-10-CM

## 2024-05-06 DIAGNOSIS — Z12.31 ENCOUNTER FOR SCREENING MAMMOGRAM FOR BREAST CANCER: ICD-10-CM

## 2024-05-06 DIAGNOSIS — Z00.00 ROUTINE GENERAL MEDICAL EXAMINATION AT A HEALTH CARE FACILITY: Primary | ICD-10-CM

## 2024-05-06 DIAGNOSIS — M47.26 OSTEOARTHRITIS OF SPINE WITH RADICULOPATHY, LUMBAR REGION: ICD-10-CM

## 2024-05-06 DIAGNOSIS — E11.29 TYPE 2 DIABETES MELLITUS WITH ALBUMINURIA (HCC): ICD-10-CM

## 2024-05-06 DIAGNOSIS — Z12.31 VISIT FOR SCREENING MAMMOGRAM: ICD-10-CM

## 2024-05-06 DIAGNOSIS — E11.69 TYPE 2 DIABETES MELLITUS WITH OBESITY (HCC): ICD-10-CM

## 2024-05-06 DIAGNOSIS — L65.9 HAIR LOSS: ICD-10-CM

## 2024-05-06 DIAGNOSIS — E11.69 HYPERLIPIDEMIA ASSOCIATED WITH TYPE 2 DIABETES MELLITUS (HCC): ICD-10-CM

## 2024-05-06 DIAGNOSIS — M25.50 POLYARTHRALGIA: ICD-10-CM

## 2024-05-06 DIAGNOSIS — Z00.00 ROUTINE GENERAL MEDICAL EXAMINATION AT A HEALTH CARE FACILITY: ICD-10-CM

## 2024-05-06 DIAGNOSIS — H35.373 MACULAR PUCKER OF BOTH EYES: ICD-10-CM

## 2024-05-06 DIAGNOSIS — M85.89 OSTEOPENIA OF MULTIPLE SITES: ICD-10-CM

## 2024-05-06 DIAGNOSIS — E78.5 HYPERLIPIDEMIA ASSOCIATED WITH TYPE 2 DIABETES MELLITUS (HCC): ICD-10-CM

## 2024-05-06 DIAGNOSIS — Z87.19 HISTORY OF ULCERATIVE COLITIS: ICD-10-CM

## 2024-05-06 PROBLEM — G89.29 CHRONIC BILATERAL LOW BACK PAIN WITHOUT SCIATICA: Status: RESOLVED | Noted: 2018-12-03 | Resolved: 2024-05-06

## 2024-05-06 PROBLEM — R19.4 CHANGE IN BOWEL HABITS: Status: RESOLVED | Noted: 2021-06-21 | Resolved: 2024-05-06

## 2024-05-06 PROBLEM — M54.50 CHRONIC BILATERAL LOW BACK PAIN WITHOUT SCIATICA: Status: RESOLVED | Noted: 2018-12-03 | Resolved: 2024-05-06

## 2024-05-06 LAB
ALT SERPL-CCNC: 15 U/L
ANION GAP SERPL CALC-SCNC: 5 MMOL/L (ref 0–18)
AST SERPL-CCNC: 17 U/L (ref ?–34)
BUN BLD-MCNC: 16 MG/DL (ref 9–23)
BUN/CREAT SERPL: 20.5 (ref 10–20)
CALCIUM BLD-MCNC: 10.1 MG/DL (ref 8.7–10.4)
CHLORIDE SERPL-SCNC: 106 MMOL/L (ref 98–112)
CHOLEST SERPL-MCNC: 125 MG/DL (ref ?–200)
CO2 SERPL-SCNC: 28 MMOL/L (ref 21–32)
CREAT BLD-MCNC: 0.78 MG/DL
CREAT UR-SCNC: 39.6 MG/DL
EGFRCR SERPLBLD CKD-EPI 2021: 82 ML/MIN/1.73M2 (ref 60–?)
EST. AVERAGE GLUCOSE BLD GHB EST-MCNC: 180 MG/DL (ref 68–126)
FASTING PATIENT LIPID ANSWER: YES
FASTING STATUS PATIENT QL REPORTED: YES
GLUCOSE BLD-MCNC: 163 MG/DL (ref 70–99)
HBA1C MFR BLD: 7.9 % (ref ?–5.7)
HDLC SERPL-MCNC: 36 MG/DL (ref 40–59)
LDLC SERPL CALC-MCNC: 66 MG/DL (ref ?–100)
MICROALBUMIN UR-MCNC: 1 MG/DL
MICROALBUMIN/CREAT 24H UR-RTO: 25.3 UG/MG (ref ?–30)
NONHDLC SERPL-MCNC: 89 MG/DL (ref ?–130)
OSMOLALITY SERPL CALC.SUM OF ELEC: 293 MOSM/KG (ref 275–295)
POTASSIUM SERPL-SCNC: 4.6 MMOL/L (ref 3.5–5.1)
SODIUM SERPL-SCNC: 139 MMOL/L (ref 136–145)
TRIGL SERPL-MCNC: 130 MG/DL (ref 30–149)
VIT D+METAB SERPL-MCNC: 42.1 NG/ML (ref 30–100)
VLDLC SERPL CALC-MCNC: 20 MG/DL (ref 0–30)

## 2024-05-06 PROCEDURE — 80061 LIPID PANEL: CPT

## 2024-05-06 PROCEDURE — 82043 UR ALBUMIN QUANTITATIVE: CPT

## 2024-05-06 PROCEDURE — 84450 TRANSFERASE (AST) (SGOT): CPT

## 2024-05-06 PROCEDURE — 92229 IMG RTA DETC/MNTR DS POC ALY: CPT | Performed by: INTERNAL MEDICINE

## 2024-05-06 PROCEDURE — 82570 ASSAY OF URINE CREATININE: CPT

## 2024-05-06 PROCEDURE — 80048 BASIC METABOLIC PNL TOTAL CA: CPT

## 2024-05-06 PROCEDURE — 82306 VITAMIN D 25 HYDROXY: CPT

## 2024-05-06 PROCEDURE — 84460 ALANINE AMINO (ALT) (SGPT): CPT

## 2024-05-06 PROCEDURE — 83036 HEMOGLOBIN GLYCOSYLATED A1C: CPT

## 2024-05-06 PROCEDURE — 36415 COLL VENOUS BLD VENIPUNCTURE: CPT

## 2024-05-06 RX ORDER — MELOXICAM 15 MG/1
15 TABLET ORAL DAILY PRN
Qty: 30 TABLET | Refills: 1 | Status: SHIPPED | OUTPATIENT
Start: 2024-05-06

## 2024-05-06 RX ORDER — NICOTINE POLACRILEX 4 MG/1
20 GUM, CHEWING ORAL DAILY
Qty: 30 TABLET | Refills: 1 | Status: SHIPPED | OUTPATIENT
Start: 2024-05-06 | End: 2024-06-05

## 2024-05-06 NOTE — PROGRESS NOTES
Nigel Olivia is a 68 year old female.     HPI:   Patient presents for medicare wellness exam and additional issues noted below.  She is overdue for labs.   Osteopenia - she is taking calcium and vitamin D  DM - she is overdue for her labs. Does not check her sugars at home.   DM eye exam - not done since 2021.   Weight management - she feels seh eats well but over past few months she has not been very active d/t her left left pain  Albuminuria - tolerating ACE  Left leg pain - stars in low back and radiates down into her toes. This started a few months ago. No injury or fall. Uses scheduled tylenol BID. Using ibuprofen 1-2 times/day which is helping. Pain is keeping her awake. Also has symptoms in RLE but not as severe as LLE. Sometiems worsens with walking. Heat also helps. No paresthesias of LLE. She also feels her LLE is weaker than the RLE. Pain is stable in severity.   Goals of care - lives with her daughter and her 3 grandchildren and she finds meaning and purpose in this.     REVIEW OF SYSTEMS:   GENERAL HEALTH: feels well otherwise. No f/c  NEURO: denies any headaches, LH, dizzyness, LOC, falls  VISION: denies any blurred or double vision  RESPIRATORY: denies shortness of breath, cough, or congestion  CARDIOVASCULAR: denies chest pain, pressure or palpitations  GI: denies abdominal pain, constipation, diarrhea, n/v, BRBPR, melena  : no dysuria or hematuria or vaginal bleeding  SKIN: denies any unusual skin lesions or rashes  PSYCH: mood is stable. Denies depression or anxiety.   EXT: denies edema       Wt Readings from Last 6 Encounters:   05/15/23 176 lb 9.6 oz (80.1 kg)   06/01/22 170 lb (77.1 kg)   04/22/22 171 lb 6.4 oz (77.7 kg)   06/21/21 179 lb (81.2 kg)   04/21/21 176 lb 12.8 oz (80.2 kg)   10/22/20 172 lb 3.2 oz (78.1 kg)       No Known Allergies    Family History   Problem Relation Age of Onset    Heart Disorder Father         MI    Diabetes Father     Heart Attack Father       Past  Medical History:    Arthritis    Back pain    Blurred vision    Eye disease    High cholesterol    Pain in joints    Pre-diabetes    Wears glasses      Past Surgical History:   Procedure Laterality Date    Colonoscopy  07/17/2018    Oophorectomy Right 1994    cyst    Other surgical history  1998    right ovarian removal for cyst      Social History:    Social History     Socioeconomic History    Marital status:    Tobacco Use    Smoking status: Never    Smokeless tobacco: Never   Vaping Use    Vaping status: Never Used   Substance and Sexual Activity    Alcohol use: No    Drug use: No   Other Topics Concern    Caffeine Concern Yes     Comment: tea 1 cup morning    Exercise Yes     Comment: sometimes           EXAM:   /80 (BP Location: Left arm)   Wt 180 lb (81.6 kg)   BMI 34.01 kg/m²   GENERAL: A&O well developed, well nourished,in no apparent distress  SKIN: no rashes,no suspicious lesions  HEENT: atraumatic, MMM, throat is clear  NECK: supple, no jvd, no thyromegaly  LUNGS: clear to auscultation bilateraly, no c/w/r  CARDIO: RRR without g/m/r  GI: obese, softly distended, non-tender, no HSM, normal BS throughout  NEURO: CN 2-12 grossly intact, strength 5/5 bilateral LEs  PSYCH: pleasant  EXTREMITIES: no cyanosis, clubbing or edema  Bilateral barefoot skin diabetic exam is normal, visualized feet and the appearance is normal.  Bilateral monofilament/sensation of both feet is normal.  Pulsation pedal pulse exam of both lower legs/feet is normal as well.  BACK: no spinal or paraspinal tenderness on exam. Left sided SLR reproduces back pain.       ASSESSMENT AND PLAN:   # Lumbar DJDz with left sided radiculopathy: tylenol and ibuprofen are not helping. She will attend PT. Meloxicam to replace ibuprofen and cont scheduled tylenol. Add PPI for GI ppx while on meloxicam (temporarily). If no improvement in 2 -4 weeks with PT will see pain management to consider LESI  # Type 2 Diabetes: Diet controlled. A1C  at goal in 3/2023. Overdue for labs.   - Counseled again on healthy plant based nutrition, regular exercise, and practicing mindfulness. We outlined small, achievable goals.   - DM eye exam on 7/2021 by Leeann: no diabetic retinopathy b/l. Complete DM eye exam in office today.   - Foot Exam: done on 2024 , educated on nightly foot exams  - LDL: see below  - BP: controlled without meds  - micro alb:creat ordered   - Depression Screen: done 2024  - not on ASA  # Obese in DM, BMI 34: gaining weight due to inactivity. See management above. Counseled on healthy plant based nutrition, regular exercise, and practicing mindfulness. We outlined small, achievable goals.   # HLP assoc with DM: at goal on statin therpay   # Albuminuria 2/2 DM: tolerating lisinopril.   # History of Ulcerative Colitis: Calprotectin was mildly elevated but patient denies bloody stools or diarrhea so GI states she can complete colonoscopy in 2028.  - She did not need treatment b/c has remained asymptomatic by avoiding nsaids.   - colonoscopy by Dr. Rory Hansen on 7/17/2018: moderate segmental inflammation in proximal ascending colon. Ulcerative, erythematous, and edematous colitis. Also had colitis involving distal sigmoid colon 20 cm to the rectum. Biopsies showed features favoring crohn's disease. States she had a normal colonoscopy in 2015.   # Osteopenia of lumbar spine and femoral neck - Low FRAX per DEXA 5/2022.  Educated on calcium/vit D3, weight bearing exercises, fall prevention.   # Bilateral Macular Pucker and Macular Degeneration - overdue for f/u with optho.   # Health Maintenance: medicare wellness exam on 5/6/2024   Stress Management: feels she is coping well  Indication for ASA (50-60 yo): avoid ASA for colitis as above.   Colon Cancer Screening: see above  Breast Cancer Screening: overdue for mammogram   Bone Health/Fall Prevention (Felix Chi): see above  Vaccines: up to date with prevnar 13, pneumovac 23, shingrix. TDAP 2020.  Advised on COVID, RSV, and flu.   Hep C screening (born [birth year]): Ab negative in 2018  Medical POA: daughter, Eli Salinas would be primary     The patient indicates understanding of these issues and agrees to the plan.  The patient is asked to return in 6 months for weight management.   Shandra Serrano MD

## 2024-05-06 NOTE — PATIENT INSTRUCTIONS
I recommend the following vaccines -  Annual FLU and RSV every September, October.    Stay up to date with COVID vaccines.     Please complete your mammogram and labs as soon as possible.     Please do not exceed 3000 mg (3 grams) of tylenol in 24 hours.   It is very important you look at the amount of tylenol (acetaminophen) in any of your over the counter medications to ensure you do not exceed a safe amount of tylenol per day.    You can also use the following topical agents for your pain. They are over the counter:  1. Topical lidocaine (lidoderm patches). Wear for 12 hours and off for 12 hours  2. Topical diclofenac gel can be used several times/day

## 2024-05-09 ENCOUNTER — TELEPHONE (OUTPATIENT)
Dept: INTERNAL MEDICINE CLINIC | Facility: CLINIC | Age: 69
End: 2024-05-09

## 2024-05-09 RX ORDER — OMEPRAZOLE 20 MG/1
20 CAPSULE, DELAYED RELEASE ORAL
Qty: 30 CAPSULE | Refills: 1 | Status: SHIPPED | OUTPATIENT
Start: 2024-05-09

## 2024-05-09 NOTE — TELEPHONE ENCOUNTER
Patient called and stated that the RX sent over to pharmacy   Omeprazole 20 MG Oral Tab EC 30 tablet 1     Is not covered in her insurance plan.     Pharmacy and patient advise if RX can be in capsules?   Insurance plan covers RX in capsules.       Preferred Pharmacy:   [id number] - [place], [place] - [address] [place] [address], [phone], [phone]   [address] [place] [id number]   Phone: [phone] Fax: [phone]   Hours: Not open 24 hours       Call back number: [phone]    Please advise.

## 2024-05-24 DIAGNOSIS — E11.69 HYPERLIPIDEMIA ASSOCIATED WITH TYPE 2 DIABETES MELLITUS (HCC): ICD-10-CM

## 2024-05-24 DIAGNOSIS — E78.5 HYPERLIPIDEMIA ASSOCIATED WITH TYPE 2 DIABETES MELLITUS (HCC): ICD-10-CM

## 2024-05-24 NOTE — TELEPHONE ENCOUNTER
Requested Renewals     atorvastatin 20 MG Oral Tab         Sig: Take 1 tablet (20 mg total) by mouth daily.    Disp: 90 tablet    Refills: 3    Start: 5/24/2024    Class: Normal    Non-formulary For: Hyperlipidemia associated with type 2 diabetes mellitus (HCC)    Last ordered: 1 year ago (5/15/2023) by Shandra Serrano MD    Cholesterol Medication Protocol Kwoaqn1305/24/2024 01:18 PM   Protocol Details ALT < 80    ALT resulted within past year    Lipid panel within past 12 months    In person appointment or virtual visit in the past 12 mos or appointment in next 3 mos      To be filled at: "[facility] #[id number] [address], [phone], [phone]        LOV: 05/06/2024  RTC: 6 months  Last Relevant Labs: 05/06/2024    Future Appointments   Date Time Provider Department Center   6/7/2024 11:00 AM Shandra Serrano MD [facility] [place]

## 2024-05-28 RX ORDER — ATORVASTATIN CALCIUM 20 MG/1
20 TABLET, FILM COATED ORAL DAILY
Qty: 90 TABLET | Refills: 3 | Status: SHIPPED | OUTPATIENT
Start: 2024-05-28

## 2024-06-07 ENCOUNTER — OFFICE VISIT (OUTPATIENT)
Dept: INTERNAL MEDICINE CLINIC | Facility: CLINIC | Age: 69
End: 2024-06-07
Payer: MEDICARE

## 2024-06-07 VITALS
HEART RATE: 84 BPM | WEIGHT: 178 LBS | RESPIRATION RATE: 16 BRPM | TEMPERATURE: 98 F | HEIGHT: 61 IN | BODY MASS INDEX: 33.61 KG/M2 | OXYGEN SATURATION: 95 % | DIASTOLIC BLOOD PRESSURE: 68 MMHG | SYSTOLIC BLOOD PRESSURE: 122 MMHG

## 2024-06-07 DIAGNOSIS — E11.65 UNCONTROLLED TYPE 2 DIABETES MELLITUS WITH HYPERGLYCEMIA (HCC): Primary | ICD-10-CM

## 2024-06-07 DIAGNOSIS — E11.69 HYPERLIPIDEMIA ASSOCIATED WITH TYPE 2 DIABETES MELLITUS (HCC): ICD-10-CM

## 2024-06-07 DIAGNOSIS — E78.5 HYPERLIPIDEMIA ASSOCIATED WITH TYPE 2 DIABETES MELLITUS (HCC): ICD-10-CM

## 2024-06-07 DIAGNOSIS — E11.69 TYPE 2 DIABETES MELLITUS WITH OBESITY (HCC): ICD-10-CM

## 2024-06-07 DIAGNOSIS — E66.9 TYPE 2 DIABETES MELLITUS WITH OBESITY (HCC): ICD-10-CM

## 2024-06-07 PROBLEM — E11.29 TYPE 2 DIABETES MELLITUS WITH ALBUMINURIA (HCC): Status: RESOLVED | Noted: 2019-02-13 | Resolved: 2024-06-07

## 2024-06-07 PROBLEM — R80.9 TYPE 2 DIABETES MELLITUS WITH ALBUMINURIA (HCC): Status: RESOLVED | Noted: 2019-02-13 | Resolved: 2024-06-07

## 2024-06-07 PROCEDURE — 99214 OFFICE O/P EST MOD 30 MIN: CPT | Performed by: INTERNAL MEDICINE

## 2024-06-07 RX ORDER — LISINOPRIL 2.5 MG/1
2.5 TABLET ORAL EVERY EVENING
Qty: 90 TABLET | Refills: 3 | Status: SHIPPED | OUTPATIENT
Start: 2024-06-07

## 2024-06-07 RX ORDER — FAMOTIDINE 20 MG/1
20 TABLET, FILM COATED ORAL NIGHTLY
Qty: 90 TABLET | Refills: 3 | Status: SHIPPED | OUTPATIENT
Start: 2024-06-07

## 2024-06-07 RX ORDER — CHOLECALCIFEROL (VITAMIN D3) 125 MCG
2000 CAPSULE ORAL DAILY
Qty: 90 TABLET | Refills: 3 | Status: SHIPPED | OUTPATIENT
Start: 2024-06-07

## 2024-06-07 NOTE — PROGRESS NOTES
Nigel Olivia is a 68 year old female.     HPI:   Patient presents for the following issues.   Left sided radiculopathy - she has not started with PT yet. She is needing meloxicam daily. Meloxicam has helped her pain significantly and even resolves it. Pain interrupts her sleep 1-2 nights per week if she tries to skip meloxicam. She is not really doing many HEP consistently.   DM  - she really wants to avoid meds and wants to try lifestyle interventions for 3-6 months. She is not checking her sugars at home. She is iwlling to.   DM eye exam  - has not made eye appt yet. Overdue for visit.   Ear wax buildup - she is using q-tips. It is itchy and occ pain. Involves both ears. Denies hearing issues.     REVIEW OF SYSTEMS:   GENERAL HEALTH: feels well otherwise. No f/c  NEURO: denies any headaches, LH, dizzyness, LOC, falls  VISION: denies any blurred or double vision  RESPIRATORY: denies shortness of breath, cough, or congestion  CARDIOVASCULAR: denies chest pain, pressure or palpitations  GI: denies abdominal pain, constipation, diarrhea, n/v, BRBPR, melena  : no dysuria or hematuria  PSYCH: mood is stable. Denies depression or anxiety.   EXT: denies edema     Wt Readings from Last 6 Encounters:   05/06/24 180 lb (81.6 kg)   05/15/23 176 lb 9.6 oz (80.1 kg)   06/01/22 170 lb (77.1 kg)   04/22/22 171 lb 6.4 oz (77.7 kg)   06/21/21 179 lb (81.2 kg)   04/21/21 176 lb 12.8 oz (80.2 kg)       No Known Allergies    Family History   Problem Relation Age of Onset    Heart Disorder Father         MI    Diabetes Father     Heart Attack Father       Past Medical History:    Arthritis    Back pain    Blurred vision    Eye disease    High cholesterol    Pain in joints    Pre-diabetes    Wears glasses      Past Surgical History:   Procedure Laterality Date    Colonoscopy  07/17/2018    Oophorectomy Right 1994    cyst    Other surgical history  1998    right ovarian removal for cyst      Social History:    Social History      Socioeconomic History    Marital status:    Tobacco Use    Smoking status: Never    Smokeless tobacco: Never   Vaping Use    Vaping status: Never Used   Substance and Sexual Activity    Alcohol use: No    Drug use: No   Other Topics Concern    Caffeine Concern Yes     Comment: tea 1 cup morning    Exercise Yes     Comment: sometimes           EXAM:   /68 (BP Location: Right arm, Patient Position: Sitting, Cuff Size: adult)   Pulse 84   Temp 97.9 °F (36.6 °C) (Temporal)   Resp 16   Ht 5' 1\" (1.549 m)   Wt 178 lb (80.7 kg)   SpO2 95%   BMI 33.63 kg/m²   GENERAL: A&O well developed, well nourished,in no apparent distress  SKIN: no rashes,no suspicious lesions  HEENT: atraumatic, bilateral TM with normal light reflex. Very little soft wax in left ear canal.   NECK: supple, no jvd, no thyromegaly  LUNGS: clear to auscultation bilateraly, no c/w/r  CARDIO: RRR without g/m/r  GI: obese, softly distended, non-tender, no HSM, normal BS throughout  NEURO: CN 2-12 grossly intact  PSYCH: pleasant  EXTREMITIES: no cyanosis, clubbing or edema      ASSESSMENT AND PLAN:   # Lumbar DJDz with left sided radiculopathy: meloxicam is helping greatly. Using PPI ppx. But she needs to start HEP and PT. She is more willing now.   # Type 2 Diabetes: uncontrolled and I have recommended medicaiton but she prefres to try lifesytle changes for 3-6 months.   - Counseled again on healthy plant based nutrition, regular exercise, and practicing mindfulness. We outlined small, achievable goals.   - DM eye exam - overdue for exam. In office was not sufficient  - Foot Exam: done on 2024 , educated on nightly foot exams  - LDL: see below  - BP: controlled without meds  - micro alb:creat < 30 in 2024  - Depression Screen: done 2024  - not on ASA  # Obese in DM, BMI 34: gaining weight due to inactivity. See management above. Counseled on healthy plant based nutrition, regular exercise, and practicing mindfulness. We outlined small,  achievable goals.   # HLP assoc with DM: at goal on statin therpay   # history of Albuminuria 2/2 DM: resolved, cont lisinopril  # History of Ulcerative Colitis: Calprotectin was mildly elevated but patient denies bloody stools or diarrhea so GI states she can complete colonoscopy in 2028.  - She did not need treatment b/c has remained asymptomatic by avoiding nsaids.   - colonoscopy by Dr. Rory Hansen on 7/17/2018: moderate segmental inflammation in proximal ascending colon. Ulcerative, erythematous, and edematous colitis. Also had colitis involving distal sigmoid colon 20 cm to the rectum. Biopsies showed features favoring crohn's disease. States she had a normal colonoscopy in 2015.   # Osteopenia of lumbar spine and femoral neck - Low FRAX per DEXA 5/2022.  Educated on calcium/vit D3, weight bearing exercises, fall prevention.   # Bilateral Macular Pucker and Macular Degeneration - overdue for f/u with optho.   # Health Maintenance: medicare wellness exam on 5/6/2024   Stress Management: feels she is coping well  Indication for ASA (50-58 yo): avoid ASA for colitis as above.   Colon Cancer Screening: see above  Breast Cancer Screening: overdue for mammogram   Bone Health/Fall Prevention (Felix Chi): see above  Vaccines: up to date with prevnar 13, pneumovac 23, shingrix. TDAP 2020. Advised on COVID, RSV, and flu.   Hep C screening (born [id number]): Ab negative in 2018  Medical POA: daughter, Eli Salinas would be primary     The patient indicates understanding of these issues and agrees to the plan.  The patient is asked to return in 6 months for DM.   Shandra Serrano MD

## 2024-08-13 RX ORDER — FAMOTIDINE 20 MG/1
20 TABLET, FILM COATED ORAL NIGHTLY
Qty: 90 TABLET | Refills: 3 | Status: SHIPPED | OUTPATIENT
Start: 2024-08-13

## 2024-08-13 NOTE — TELEPHONE ENCOUNTER
Name from pharmacy: FAMOTIDINE 20MG TABLETS         Will file in chart as: FAMOTIDINE 20 MG Oral Tab    Sig: TAKE 1 TABLET(20 MG) BY MOUTH EVERY NIGHT AT BEDTIME    Disp: 90 tablet    Refills: 3 (Pharmacy requested: Not specified)    Start: 8/13/2024    Class: Normal    Non-formulary    Last ordered: 2 months ago (6/7/2024) by Shandra Serrano MD    Last refill: 5/12/2024    Rx #: [id number]    Gastrointestional Medication Protocol Ptbifz9708/13/2024 03:21 AM   Protocol Details In person appointment or virtual visit in the past 12 mos or appointment in next 3 mos      To be filled at: [facility] #[id number] - [address], [phone], [phone]          LOV: 06/07/2024  RTC: 6 months   Labs: n/a   Last filled: 08/12/2024    No future appointments.

## 2024-11-13 NOTE — TELEPHONE ENCOUNTER
Last refill. She is overdue for labs and office visit. Orders are in. Show Aperture Variable?: Yes Consent: The patient's consent was obtained including but not limited to risks of crusting, scabbing, blistering, scarring, darker or lighter pigmentary change, recurrence, incomplete removal and infection. Render Post-Care Instructions In Note?: no Number Of Freeze-Thaw Cycles: 1 freeze-thaw cycle Detail Level: Detailed Post-Care Instructions: I reviewed with the patient in detail post-care instructions. Patient is to wear sunprotection, and avoid picking at any of the treated lesions. Pt may apply Vaseline to crusted or scabbing areas. Application Tool (Optional): Cry-AC

## 2025-02-20 DIAGNOSIS — E78.5 HYPERLIPIDEMIA ASSOCIATED WITH TYPE 2 DIABETES MELLITUS (HCC): ICD-10-CM

## 2025-02-20 DIAGNOSIS — E66.9 TYPE 2 DIABETES MELLITUS WITH OBESITY (HCC): Primary | ICD-10-CM

## 2025-02-20 DIAGNOSIS — E11.69 TYPE 2 DIABETES MELLITUS WITH OBESITY (HCC): Primary | ICD-10-CM

## 2025-02-20 DIAGNOSIS — E11.69 HYPERLIPIDEMIA ASSOCIATED WITH TYPE 2 DIABETES MELLITUS (HCC): ICD-10-CM

## 2025-02-20 RX ORDER — ATORVASTATIN CALCIUM 20 MG/1
20 TABLET, FILM COATED ORAL DAILY
Qty: 90 TABLET | Refills: 0 | Status: SHIPPED | OUTPATIENT
Start: 2025-02-20

## 2025-02-20 NOTE — TELEPHONE ENCOUNTER
Protocol passed  Requesting atorvastatin   LOV: 06/07/24  RTC: 6 months  Last Relevant Labs: 05/06/24  Filled: 05/28/24 #90 with 3 refills    No future appointments.

## 2025-02-21 NOTE — TELEPHONE ENCOUNTER
Contacted patient and informed of labs that are needed to be done and this would be the last refill until they are completed. Verbalized understanding.

## 2025-04-14 ENCOUNTER — LAB ENCOUNTER (OUTPATIENT)
Dept: LAB | Age: 70
End: 2025-04-14
Attending: INTERNAL MEDICINE
Payer: MEDICARE

## 2025-04-14 DIAGNOSIS — E11.69 HYPERLIPIDEMIA ASSOCIATED WITH TYPE 2 DIABETES MELLITUS (HCC): ICD-10-CM

## 2025-04-14 DIAGNOSIS — E11.69 DIABETES MELLITUS ASSOCIATED WITH HORMONAL ETIOLOGY (HCC): Primary | ICD-10-CM

## 2025-04-14 DIAGNOSIS — E78.5 HYPERLIPEMIA: ICD-10-CM

## 2025-04-14 DIAGNOSIS — E66.9 TYPE 2 DIABETES MELLITUS WITH OBESITY (HCC): ICD-10-CM

## 2025-04-14 DIAGNOSIS — E11.69 TYPE 2 DIABETES MELLITUS WITH OBESITY (HCC): ICD-10-CM

## 2025-04-14 DIAGNOSIS — E78.5 HYPERLIPIDEMIA ASSOCIATED WITH TYPE 2 DIABETES MELLITUS (HCC): ICD-10-CM

## 2025-04-14 LAB
ALT SERPL-CCNC: 20 U/L (ref 10–49)
ANION GAP SERPL CALC-SCNC: 9 MMOL/L (ref 0–18)
AST SERPL-CCNC: 19 U/L (ref ?–34)
BUN BLD-MCNC: 16 MG/DL (ref 9–23)
CALCIUM BLD-MCNC: 9.8 MG/DL (ref 8.7–10.6)
CHLORIDE SERPL-SCNC: 104 MMOL/L (ref 98–112)
CHOLEST SERPL-MCNC: 128 MG/DL (ref ?–200)
CO2 SERPL-SCNC: 26 MMOL/L (ref 21–32)
CREAT BLD-MCNC: 0.79 MG/DL (ref 0.55–1.02)
EGFRCR SERPLBLD CKD-EPI 2021: 80 ML/MIN/1.73M2 (ref 60–?)
EST. AVERAGE GLUCOSE BLD GHB EST-MCNC: 169 MG/DL (ref 68–126)
FASTING PATIENT LIPID ANSWER: YES
FASTING STATUS PATIENT QL REPORTED: YES
GLUCOSE BLD-MCNC: 174 MG/DL (ref 70–99)
HBA1C MFR BLD: 7.5 % (ref ?–5.7)
HDLC SERPL-MCNC: 33 MG/DL (ref 40–59)
LDLC SERPL CALC-MCNC: 71 MG/DL (ref ?–100)
NONHDLC SERPL-MCNC: 95 MG/DL (ref ?–130)
OSMOLALITY SERPL CALC.SUM OF ELEC: 293 MOSM/KG (ref 275–295)
POTASSIUM SERPL-SCNC: 4.3 MMOL/L (ref 3.5–5.1)
SODIUM SERPL-SCNC: 139 MMOL/L (ref 136–145)
TRIGL SERPL-MCNC: 135 MG/DL (ref 30–149)
VLDLC SERPL CALC-MCNC: 21 MG/DL (ref 0–30)

## 2025-04-14 PROCEDURE — 36415 COLL VENOUS BLD VENIPUNCTURE: CPT

## 2025-04-14 PROCEDURE — 84450 TRANSFERASE (AST) (SGOT): CPT

## 2025-04-14 PROCEDURE — 80061 LIPID PANEL: CPT

## 2025-04-14 PROCEDURE — 80048 BASIC METABOLIC PNL TOTAL CA: CPT

## 2025-04-14 PROCEDURE — 83036 HEMOGLOBIN GLYCOSYLATED A1C: CPT

## 2025-04-14 PROCEDURE — 82570 ASSAY OF URINE CREATININE: CPT

## 2025-04-14 PROCEDURE — 82043 UR ALBUMIN QUANTITATIVE: CPT

## 2025-04-14 PROCEDURE — 84460 ALANINE AMINO (ALT) (SGPT): CPT

## 2025-04-17 ENCOUNTER — LAB ENCOUNTER (OUTPATIENT)
Dept: LAB | Age: 70
End: 2025-04-17
Attending: INTERNAL MEDICINE
Payer: MEDICARE

## 2025-04-17 ENCOUNTER — TELEPHONE (OUTPATIENT)
Dept: INTERNAL MEDICINE CLINIC | Facility: CLINIC | Age: 70
End: 2025-04-17

## 2025-04-17 DIAGNOSIS — E78.5 HYPERLIPEMIA: ICD-10-CM

## 2025-04-17 DIAGNOSIS — E11.69 DIABETES MELLITUS ASSOCIATED WITH HORMONAL ETIOLOGY (HCC): ICD-10-CM

## 2025-04-17 DIAGNOSIS — E11.65 UNCONTROLLED TYPE 2 DIABETES MELLITUS WITH HYPERGLYCEMIA (HCC): ICD-10-CM

## 2025-04-17 LAB
CREAT UR-SCNC: 43 MG/DL
MICROALBUMIN UR-MCNC: 2.4 MG/DL
MICROALBUMIN/CREAT 24H UR-RTO: 55.8 UG/MG (ref ?–30)

## 2025-04-17 PROCEDURE — 82043 UR ALBUMIN QUANTITATIVE: CPT

## 2025-04-17 PROCEDURE — 82570 ASSAY OF URINE CREATININE: CPT

## 2025-04-17 NOTE — TELEPHONE ENCOUNTER
Called patient to provide lab results; LM.     Please schedule for Medicare wellness visit when pt calls back.

## 2025-05-19 ENCOUNTER — TELEPHONE (OUTPATIENT)
Dept: INTERNAL MEDICINE CLINIC | Facility: CLINIC | Age: 70
End: 2025-05-19

## 2025-05-19 DIAGNOSIS — Z12.31 ENCOUNTER FOR SCREENING MAMMOGRAM FOR MALIGNANT NEOPLASM OF BREAST: Primary | ICD-10-CM

## 2025-05-28 NOTE — TELEPHONE ENCOUNTER
Pt stated has been waiting for rx for a week per pt the pharmacy sent it a week ago   Wondering if we can refill today

## 2025-05-28 NOTE — TELEPHONE ENCOUNTER
Requesting:   lisinopril 2.5 MG Oral Tab         Sig: Take 1 tablet (2.5 mg total) by mouth every evening.    Disp: 90 tablet    Refills: 3     Protocol: passed     LOV:06/07/2024  RTC:06 months (overdue)  Labs:04/14/2025  Last filled:02/24/2025  Future Appointments   Date Time Provider Department Center   8/15/2025  8:30 AM Shandra Serrano MD [facility] [place]

## 2025-05-29 RX ORDER — LISINOPRIL 2.5 MG/1
2.5 TABLET ORAL EVERY EVENING
Qty: 90 TABLET | Refills: 0 | Status: SHIPPED | OUTPATIENT
Start: 2025-05-29

## 2025-08-15 ENCOUNTER — OFFICE VISIT (OUTPATIENT)
Dept: INTERNAL MEDICINE CLINIC | Facility: CLINIC | Age: 70
End: 2025-08-15

## 2025-08-15 VITALS
SYSTOLIC BLOOD PRESSURE: 122 MMHG | WEIGHT: 170.63 LBS | BODY MASS INDEX: 33.5 KG/M2 | HEART RATE: 82 BPM | TEMPERATURE: 98 F | OXYGEN SATURATION: 97 % | DIASTOLIC BLOOD PRESSURE: 72 MMHG | HEIGHT: 60 IN

## 2025-08-15 DIAGNOSIS — R80.9 TYPE 2 DIABETES MELLITUS WITH ALBUMINURIA (HCC): ICD-10-CM

## 2025-08-15 DIAGNOSIS — E66.9 TYPE 2 DIABETES MELLITUS WITH OBESITY (HCC): ICD-10-CM

## 2025-08-15 DIAGNOSIS — Z12.31 ENCOUNTER FOR SCREENING MAMMOGRAM FOR MALIGNANT NEOPLASM OF BREAST: ICD-10-CM

## 2025-08-15 DIAGNOSIS — E11.29 TYPE 2 DIABETES MELLITUS WITH ALBUMINURIA (HCC): ICD-10-CM

## 2025-08-15 DIAGNOSIS — E78.5 HYPERLIPIDEMIA ASSOCIATED WITH TYPE 2 DIABETES MELLITUS (HCC): ICD-10-CM

## 2025-08-15 DIAGNOSIS — M85.89 OSTEOPENIA OF MULTIPLE SITES: ICD-10-CM

## 2025-08-15 DIAGNOSIS — E11.69 TYPE 2 DIABETES MELLITUS WITH OBESITY (HCC): ICD-10-CM

## 2025-08-15 DIAGNOSIS — Z00.00 ROUTINE GENERAL MEDICAL EXAMINATION AT A HEALTH CARE FACILITY: Primary | ICD-10-CM

## 2025-08-15 DIAGNOSIS — E11.69 HYPERLIPIDEMIA ASSOCIATED WITH TYPE 2 DIABETES MELLITUS (HCC): ICD-10-CM

## 2025-08-15 LAB — HEMOGLOBIN A1C: 7.4 % (ref 4.3–5.6)

## 2025-08-15 RX ORDER — ATORVASTATIN CALCIUM 20 MG/1
20 TABLET, FILM COATED ORAL DAILY
Qty: 90 TABLET | Refills: 3 | Status: SHIPPED | OUTPATIENT
Start: 2025-08-15

## 2025-08-15 RX ORDER — CHOLECALCIFEROL (VITAMIN D3) 50 MCG
2000 TABLET ORAL DAILY
Qty: 90 TABLET | Refills: 3 | Status: SHIPPED | OUTPATIENT
Start: 2025-08-15

## 2025-08-15 RX ORDER — LISINOPRIL 2.5 MG/1
2.5 TABLET ORAL EVERY EVENING
Qty: 90 TABLET | Refills: 3 | Status: SHIPPED | OUTPATIENT
Start: 2025-08-15

## 2025-08-15 RX ORDER — FAMOTIDINE 20 MG/1
20 TABLET, FILM COATED ORAL NIGHTLY
Qty: 90 TABLET | Refills: 3 | Status: SHIPPED | OUTPATIENT
Start: 2025-08-15

## (undated) DIAGNOSIS — E78.5 HYPERLIPIDEMIA ASSOCIATED WITH TYPE 2 DIABETES MELLITUS (HCC): ICD-10-CM

## (undated) DIAGNOSIS — E11.69 HYPERLIPIDEMIA ASSOCIATED WITH TYPE 2 DIABETES MELLITUS (HCC): ICD-10-CM

## (undated) NOTE — Clinical Note
Mary Lou Acosta saw Ms. Lata Maxwell yesterday- was very concerned as the pt stated that her ophthalmologist was worried about possibly temporal arteritis. However no sed rate, CRP or temporal artery biopsy was pursued by them.   I reached out to their office,

## (undated) NOTE — LETTER
08/25/20        Nigel [address] [facility]      Dear Candelaria Ruth,    [facility] records indicate that you have outstanding lab work and or testing that was ordered for you and has not yet been completed:      XR DEXA BONE DENSITOMETRY

## (undated) NOTE — LETTER
10/07/20        Nigel [address] [facility]      Dear Rosi Allen,    [facility] records indicate that you have outstanding lab work and or testing that was ordered for you and has not yet been completed:  Orders Placed This Encounter

## (undated) NOTE — Clinical Note
Sheryl Almanza you're doing well. Just FYI I saw Mrs. Americo Andrea for follow up today. Please see the discussion portion of my note. In summary, she would like to wait to try any prescription intervention or steroid injection for her pain.  I would recommend CT c

## (undated) NOTE — LETTER
SABA URBINA   • [address]  with Disabilities Certification for Parking Placard/License Plates  NOTE TO ALL DISABILITY LICENSE PLATE OWNERS:  If you have a disability license plate, you must execute this certification Standards and Medical Professional Certification  As a licensed physician, advanced practice nurse, chiropractor, optometrist or physician’s assistant, I certify the individual named in Part 1 has a condition that constitutes him/her as a person with disab Address*   [address], [place] [address] [facility]  Dept: [phone]  Dept Fax: [phone]   Medical Professional’s [address] [place] [address] Professional License Number*  [id number] Today’s Date* neurological, cardiovascular or lung condition in which the degree of debilitation is so severe that it almost completely impedes the ability to walk. []    The patient is under 25years of age and incapable of driving.      Signature of Physician, Chiropr to the following address. Permanent Disabled Parking Placard applications must be mailed to: , Persons with Disabilities License Plates/Placard Unit, [address], [facility], [address] [place] [address].      FOR  OFFICE USE